# Patient Record
Sex: MALE | Race: WHITE | Employment: OTHER | URBAN - METROPOLITAN AREA
[De-identification: names, ages, dates, MRNs, and addresses within clinical notes are randomized per-mention and may not be internally consistent; named-entity substitution may affect disease eponyms.]

---

## 2018-06-27 ENCOUNTER — HOSPITAL ENCOUNTER (OUTPATIENT)
Age: 58
Discharge: HOME | End: 2018-06-29
Payer: COMMERCIAL

## 2018-06-27 ENCOUNTER — OFFICE VISIT (OUTPATIENT)
Dept: FAMILY MEDICINE CLINIC | Age: 58
End: 2018-06-27
Payer: COMMERCIAL

## 2018-06-27 VITALS
RESPIRATION RATE: 14 BRPM | TEMPERATURE: 98 F | WEIGHT: 179 LBS | BODY MASS INDEX: 29.82 KG/M2 | OXYGEN SATURATION: 93 % | HEART RATE: 70 BPM | SYSTOLIC BLOOD PRESSURE: 120 MMHG | DIASTOLIC BLOOD PRESSURE: 87 MMHG | HEIGHT: 65 IN

## 2018-06-27 DIAGNOSIS — E56.9 VITAMIN DEFICIENCY: ICD-10-CM

## 2018-06-27 DIAGNOSIS — E78.2 MIXED HYPERLIPIDEMIA: ICD-10-CM

## 2018-06-27 DIAGNOSIS — Z00.00 HEALTHCARE MAINTENANCE: Primary | ICD-10-CM

## 2018-06-27 DIAGNOSIS — R10.84 GENERALIZED ABDOMINAL PAIN: ICD-10-CM

## 2018-06-27 DIAGNOSIS — Z00.00 HEALTHCARE MAINTENANCE: ICD-10-CM

## 2018-06-27 DIAGNOSIS — F17.210 CIGARETTE NICOTINE DEPENDENCE WITHOUT COMPLICATION: ICD-10-CM

## 2018-06-27 DIAGNOSIS — Z12.5 PROSTATE CANCER SCREENING: ICD-10-CM

## 2018-06-27 DIAGNOSIS — Z12.11 COLON CANCER SCREENING: ICD-10-CM

## 2018-06-27 DIAGNOSIS — Z86.19 HISTORY OF HELICOBACTER PYLORI INFECTION: ICD-10-CM

## 2018-06-27 LAB
ALBUMIN SERPL-MCNC: 4.6 G/DL (ref 3.5–5.2)
ALP BLD-CCNC: 79 U/L (ref 40–129)
ALT SERPL-CCNC: 9 U/L (ref 0–40)
ANION GAP SERPL CALCULATED.3IONS-SCNC: 14 MMOL/L (ref 7–16)
AST SERPL-CCNC: 25 U/L (ref 0–39)
BILIRUB SERPL-MCNC: 0.3 MG/DL (ref 0–1.2)
BUN BLDV-MCNC: 16 MG/DL (ref 6–20)
CALCIUM SERPL-MCNC: 9.2 MG/DL (ref 8.6–10.2)
CHLORIDE BLD-SCNC: 100 MMOL/L (ref 98–107)
CHOLESTEROL, TOTAL: 253 MG/DL (ref 0–199)
CO2: 23 MMOL/L (ref 22–29)
CREAT SERPL-MCNC: 0.8 MG/DL (ref 0.7–1.2)
GFR AFRICAN AMERICAN: >60
GFR NON-AFRICAN AMERICAN: >60 ML/MIN/1.73
GLUCOSE BLD-MCNC: 84 MG/DL (ref 74–109)
HCT VFR BLD CALC: 47.8 % (ref 37–54)
HDLC SERPL-MCNC: 45 MG/DL
HEMOGLOBIN: 15.5 G/DL (ref 12.5–16.5)
LDL CHOLESTEROL CALCULATED: 167 MG/DL (ref 0–99)
MCH RBC QN AUTO: 29 PG (ref 26–35)
MCHC RBC AUTO-ENTMCNC: 32.4 % (ref 32–34.5)
MCV RBC AUTO: 89.5 FL (ref 80–99.9)
PDW BLD-RTO: 14.6 FL (ref 11.5–15)
PLATELET # BLD: 391 E9/L (ref 130–450)
PMV BLD AUTO: 11.1 FL (ref 7–12)
POTASSIUM SERPL-SCNC: 4.9 MMOL/L (ref 3.5–5)
RBC # BLD: 5.34 E12/L (ref 3.8–5.8)
SODIUM BLD-SCNC: 137 MMOL/L (ref 132–146)
TOTAL PROTEIN: 7.5 G/DL (ref 6.4–8.3)
TRIGL SERPL-MCNC: 205 MG/DL (ref 0–149)
TSH SERPL DL<=0.05 MIU/L-ACNC: 1.79 UIU/ML (ref 0.27–4.2)
VITAMIN D 25-HYDROXY: 32 NG/ML (ref 30–100)
VLDLC SERPL CALC-MCNC: 41 MG/DL
WBC # BLD: 9 E9/L (ref 4.5–11.5)

## 2018-06-27 PROCEDURE — 84443 ASSAY THYROID STIM HORMONE: CPT

## 2018-06-27 PROCEDURE — 82306 VITAMIN D 25 HYDROXY: CPT

## 2018-06-27 PROCEDURE — 85027 COMPLETE CBC AUTOMATED: CPT

## 2018-06-27 PROCEDURE — 80053 COMPREHEN METABOLIC PANEL: CPT

## 2018-06-27 PROCEDURE — 99214 OFFICE O/P EST MOD 30 MIN: CPT | Performed by: NURSE PRACTITIONER

## 2018-06-27 PROCEDURE — 86677 HELICOBACTER PYLORI ANTIBODY: CPT

## 2018-06-27 PROCEDURE — 80061 LIPID PANEL: CPT

## 2018-06-27 RX ORDER — PANTOPRAZOLE SODIUM 20 MG/1
20 TABLET, DELAYED RELEASE ORAL DAILY
Qty: 30 TABLET | Refills: 3 | Status: SHIPPED | OUTPATIENT
Start: 2018-06-27 | End: 2018-12-20 | Stop reason: SDUPTHER

## 2018-06-27 ASSESSMENT — PATIENT HEALTH QUESTIONNAIRE - PHQ9
1. LITTLE INTEREST OR PLEASURE IN DOING THINGS: 0
SUM OF ALL RESPONSES TO PHQ QUESTIONS 1-9: 0
2. FEELING DOWN, DEPRESSED OR HOPELESS: 0
SUM OF ALL RESPONSES TO PHQ9 QUESTIONS 1 & 2: 0

## 2018-06-27 NOTE — PATIENT INSTRUCTIONS
Patient Education        High Cholesterol: Care Instructions  Your Care Instructions    Cholesterol is a type of fat in your blood. It is needed for many body functions, such as making new cells. Cholesterol is made by your body. It also comes from food you eat. High cholesterol means that you have too much of the fat in your blood. This raises your risk of a heart attack and stroke. LDL and HDL are part of your total cholesterol. LDL is the \"bad\" cholesterol. High LDL can raise your risk for heart disease, heart attack, and stroke. HDL is the \"good\" cholesterol. It helps clear bad cholesterol from the body. High HDL is linked with a lower risk of heart disease, heart attack, and stroke. Your cholesterol levels help your doctor find out your risk for having a heart attack or stroke. You and your doctor can talk about whether you need to lower your risk and what treatment is best for you. A heart-healthy lifestyle along with medicines can help lower your cholesterol and your risk. The way you choose to lower your risk will depend on how high your risk is for heart attack and stroke. It will also depend on how you feel about taking medicines. Follow-up care is a key part of your treatment and safety. Be sure to make and go to all appointments, and call your doctor if you are having problems. It's also a good idea to know your test results and keep a list of the medicines you take. How can you care for yourself at home? · Eat a variety of foods every day. Good choices include fruits, vegetables, whole grains (like oatmeal), dried beans and peas, nuts and seeds, soy products (like tofu), and fat-free or low-fat dairy products. · Replace butter, margarine, and hydrogenated or partially hydrogenated oils with olive and canola oils. (Canola oil margarine without trans fat is fine.)  · Replace red meat with fish, poultry, and soy protein (like tofu).   · Limit processed and packaged foods like chips, crackers, and cookies. · Bake, broil, or steam foods. Don't lerma them. · Be physically active. Get at least 30 minutes of exercise on most days of the week. Walking is a good choice. You also may want to do other activities, such as running, swimming, cycling, or playing tennis or team sports. · Stay at a healthy weight or lose weight by making the changes in eating and physical activity listed above. Losing just a small amount of weight, even 5 to 10 pounds, can reduce your risk for having a heart attack or stroke. · Do not smoke. When should you call for help? Watch closely for changes in your health, and be sure to contact your doctor if:    · You need help making lifestyle changes.     · You have questions about your medicine. Where can you learn more? Go to https://SpaBookerpepiceweb.IKOTECH. org and sign in to your ParcelPoint account. Enter L958 in the coComment box to learn more about \"High Cholesterol: Care Instructions. \"     If you do not have an account, please click on the \"Sign Up Now\" link. Current as of: May 10, 2017  Content Version: 11.6  © 4122-0708 [address]. Care instructions adapted under license by [facility] ([facility]). If you have questions about a medical condition or this instruction, always ask your healthcare professional. [address] any warranty or liability for your use of this information. Patient Education        Stopping Smoking: Care Instructions  Your Care Instructions  Cigarette smokers crave the nicotine in cigarettes. Giving it up is much harder than simply changing a habit. Your body has to stop craving the nicotine. It is hard to quit, but you can do it. There are many tools that people use to quit smoking. You may find that combining tools works best for you. There are several steps to quitting. First you get ready to quit. Then you get support to help you. After that, you learn new skills and behaviors to become a nonsmoker.  For many people, a necessary step is getting and using medicine. Your doctor will help you set up the plan that best meets your needs. You may want to attend a smoking cessation program to help you quit smoking. When you choose a program, look for one that has proven success. Ask your doctor for ideas. You will greatly increase your chances of success if you take medicine as well as get counseling or join a cessation program.  Some of the changes you feel when you first quit tobacco are uncomfortable. Your body will miss the nicotine at first, and you may feel short-tempered and grumpy. You may have trouble sleeping or concentrating. Medicine can help you deal with these symptoms. You may struggle with changing your smoking habits and rituals. The last step is the tricky one: Be prepared for the smoking urge to continue for a time. This is a lot to deal with, but keep at it. You will feel better. Follow-up care is a key part of your treatment and safety. Be sure to make and go to all appointments, and call your doctor if you are having problems. It's also a good idea to know your test results and keep a list of the medicines you take. How can you care for yourself at home? · Ask your family, friends, and coworkers for support. You have a better chance of quitting if you have help and support. · Join a support group, such as Nicotine Anonymous, for people who are trying to quit smoking. · Consider signing up for a smoking cessation program, such as the American Lung Association's Freedom from Smoking program.  · Get text messaging support. Go to the website at www.smokefree. gov to sign up for the [facility] program.  · Set a quit date. Pick your date carefully so that it is not right in the middle of a big deadline or stressful time. Once you quit, do not even take a puff. Get rid of all ashtrays and lighters after your last cigarette. Clean your house and your clothes so that they do not smell of smoke.   · Learn

## 2018-06-28 LAB — HELICOBACTER PYLORI IGG: NORMAL

## 2018-07-01 RX ORDER — AMOXICILLIN 500 MG/1
CAPSULE ORAL
Qty: 56 CAPSULE | Refills: 0 | Status: SHIPPED | OUTPATIENT
Start: 2018-07-01 | End: 2018-08-01 | Stop reason: ALTCHOICE

## 2018-07-01 RX ORDER — CLARITHROMYCIN 500 MG/1
500 TABLET, COATED ORAL 2 TIMES DAILY
Qty: 28 TABLET | Refills: 0 | Status: SHIPPED | OUTPATIENT
Start: 2018-07-01 | End: 2018-09-25 | Stop reason: SDUPTHER

## 2018-07-02 ENCOUNTER — TELEPHONE (OUTPATIENT)
Dept: FAMILY MEDICINE CLINIC | Age: 58
End: 2018-07-02

## 2018-07-02 DIAGNOSIS — Z12.11 COLON CANCER SCREENING: ICD-10-CM

## 2018-07-02 DIAGNOSIS — R19.5 POSITIVE FIT (FECAL IMMUNOCHEMICAL TEST): Primary | ICD-10-CM

## 2018-07-02 LAB
CONTROL: NORMAL
HEMOCCULT STL QL: NORMAL

## 2018-07-02 PROCEDURE — 82274 ASSAY TEST FOR BLOOD FECAL: CPT | Performed by: NURSE PRACTITIONER

## 2018-07-02 NOTE — TELEPHONE ENCOUNTER
Pt returned fit test. Results positive. .Pt called. No answer. Left voicemail for pt to call office back.

## 2018-07-03 ENCOUNTER — TELEPHONE (OUTPATIENT)
Dept: SURGERY | Age: 58
End: 2018-07-03

## 2018-07-03 RX ORDER — EZETIMIBE 10 MG/1
10 TABLET ORAL DAILY
Qty: 30 TABLET | Refills: 5 | Status: SHIPPED | OUTPATIENT
Start: 2018-07-03 | End: 2019-07-06 | Stop reason: SDUPTHER

## 2018-07-05 ENCOUNTER — TELEPHONE (OUTPATIENT)
Dept: SURGERY | Age: 58
End: 2018-07-05

## 2018-07-08 ASSESSMENT — ENCOUNTER SYMPTOMS
PHOTOPHOBIA: 0
EYE ITCHING: 0
ABDOMINAL PAIN: 1
CHOKING: 0
EYE DISCHARGE: 0
APNEA: 0
RECTAL PAIN: 0
WHEEZING: 0
VOICE CHANGE: 0
SHORTNESS OF BREATH: 0
BLOOD IN STOOL: 0
STRIDOR: 0
ABDOMINAL DISTENTION: 0
COLOR CHANGE: 0
EYE PAIN: 0
CHEST TIGHTNESS: 0
SORE THROAT: 0
TROUBLE SWALLOWING: 0
DIARRHEA: 0
CONSTIPATION: 0
ANAL BLEEDING: 0
COUGH: 0
VOMITING: 0
EYE REDNESS: 0
NAUSEA: 0

## 2018-07-08 NOTE — PROGRESS NOTES
Sabino Pizano is a 62 y.o. male who presents today for   Chief Complaint   Patient presents with    Establish Care    GI Problem     H. pylori    Nicotine Dependence     would like prescription for Chantix     Hyperlipidemia         HPI      Pt presents today to re-establish care, last PCP was Dr. Kishor Durán in 2016. Pt has a h/o H Pylori Infection, pt concerned he may have infection again, he c/o generalized abd pain that has been chronic for the past several months, Denies N/V/D, dysphagia or bloody/tarry stools. Pt stated he was compliant in the past with H Pylori treatment. He is tolerating PO well and is afebrile. He also has a h/o Hyperlipidemia, he was on Mevacor in the past but stopped d/t GI upset and muscle cramping, pt is currently not on any OTC supplements. He does follow a low fat/cholesterol diet and does try to exercise 2-3 times a week. Pt is a smoker, he does not yet feel he is ready to  Quit, discussed options, pt will consider at a later time, he has smoked for > 20 yrs, 1 PPD. Pt is due for preventative labs including PSA and FIT, will add vitamin D level d/t h/o Vitamin D Deficiency. Pt has no other concerns      [address]:  Patient's past medical, surgical, social and/or family history reviewed, updated in chart, and are non-contributory (unless otherwise stated). Medications and allergies also reviewed and updated in chart. Review of Systems  Review of Systems   Constitutional: Negative for activity change, appetite change, chills, diaphoresis, fatigue, fever and unexpected weight change. HENT: Negative for congestion, ear discharge, ear pain, hearing loss, mouth sores, nosebleeds, sore throat, tinnitus, trouble swallowing and voice change. Eyes: Negative for photophobia, pain, discharge, redness, itching and visual disturbance.    Respiratory: Negative for apnea, cough, choking, chest tightness, shortness of breath, wheezing and stridor.         + Smoker Cardiovascular: Negative for chest pain, palpitations and leg swelling. Gastrointestinal: Positive for abdominal pain (generalized x 4 quads). Negative for abdominal distention, anal bleeding, blood in stool, constipation, diarrhea, nausea, rectal pain and vomiting.        H/o H Pylori Infection   Endocrine: Negative for cold intolerance, heat intolerance, polydipsia, polyphagia and polyuria. Genitourinary: Negative for decreased urine volume, difficulty urinating, enuresis, flank pain, frequency, hematuria and urgency. Musculoskeletal: Negative for arthralgias, joint swelling and myalgias. Skin: Negative for color change, pallor, rash and wound. Neurological: Negative for dizziness, tremors, seizures, syncope, facial asymmetry, speech difficulty, weakness, light-headedness, numbness and headaches. Hematological: Negative for adenopathy. Does not bruise/bleed easily. H/o Vitamin D Deficiency   Psychiatric/Behavioral: Negative for decreased concentration, dysphoric mood, self-injury, sleep disturbance and suicidal ideas. The patient is not nervous/anxious. Physical Exam:    VS:  /87 (Site: Right Arm, Position: Sitting, Cuff Size: Large Adult)   Pulse 70   Temp 98 °F (36.7 °C) (Oral)   Resp 14   Ht 5' 5\" (1.651 m)   Wt 179 lb (81.2 kg)   SpO2 93%   BMI 29.79 kg/m²   LAST WEIGHT:  Wt Readings from Last 3 Encounters:   06/27/18 179 lb (81.2 kg)   09/08/16 179 lb (81.2 kg)   07/30/16 181 lb (82.1 kg)     Physical Exam   Constitutional: He is oriented to person, place, and time. He appears well-developed and well-nourished. No distress. HENT:   Head: Normocephalic and atraumatic. Right Ear: External ear normal.   Left Ear: External ear normal.   Nose: Nose normal.   Mouth/Throat: Oropharynx is clear and moist. No oropharyngeal exudate. Eyes: Conjunctivae and EOM are normal. Pupils are equal, round, and reactive to light. Right eye exhibits no discharge.  Left eye exhibits no

## 2018-07-09 ENCOUNTER — TELEPHONE (OUTPATIENT)
Dept: SURGERY | Age: 58
End: 2018-07-09

## 2018-07-16 ENCOUNTER — OFFICE VISIT (OUTPATIENT)
Dept: FAMILY MEDICINE CLINIC | Age: 58
End: 2018-07-16
Payer: COMMERCIAL

## 2018-07-16 VITALS
SYSTOLIC BLOOD PRESSURE: 108 MMHG | WEIGHT: 179 LBS | DIASTOLIC BLOOD PRESSURE: 72 MMHG | RESPIRATION RATE: 20 BRPM | BODY MASS INDEX: 28.77 KG/M2 | HEART RATE: 81 BPM | HEIGHT: 66 IN | TEMPERATURE: 98 F | OXYGEN SATURATION: 95 %

## 2018-07-16 DIAGNOSIS — A04.8 HELICOBACTER PYLORI GASTROINTESTINAL TRACT INFECTION: Primary | ICD-10-CM

## 2018-07-16 DIAGNOSIS — F17.210 CIGARETTE NICOTINE DEPENDENCE WITHOUT COMPLICATION: ICD-10-CM

## 2018-07-16 PROCEDURE — 99214 OFFICE O/P EST MOD 30 MIN: CPT | Performed by: NURSE PRACTITIONER

## 2018-07-16 RX ORDER — VARENICLINE TARTRATE 25 MG
KIT ORAL
Qty: 1 EACH | Refills: 0 | Status: SHIPPED | OUTPATIENT
Start: 2018-07-16 | End: 2019-04-11 | Stop reason: SDUPTHER

## 2018-07-16 ASSESSMENT — ENCOUNTER SYMPTOMS
ABDOMINAL DISTENTION: 0
STRIDOR: 0
VOICE CHANGE: 0
BLOOD IN STOOL: 0
COUGH: 0
ANAL BLEEDING: 0
EYE REDNESS: 0
CHOKING: 0
DIARRHEA: 0
WHEEZING: 0
COLOR CHANGE: 0
NAUSEA: 0
ABDOMINAL PAIN: 0
RECTAL PAIN: 0
SHORTNESS OF BREATH: 0
TROUBLE SWALLOWING: 0
VOMITING: 0
CHEST TIGHTNESS: 0
CONSTIPATION: 0
EYE DISCHARGE: 0
APNEA: 0
EYE ITCHING: 0
EYE PAIN: 0
PHOTOPHOBIA: 0

## 2018-07-16 NOTE — PROGRESS NOTES
color change, pallor and rash. Psychiatric/Behavioral: Negative for dysphoric mood, self-injury, sleep disturbance and suicidal ideas. The patient is not nervous/anxious. Physical Exam:    VS:  /72   Pulse 81   Temp 98 °F (36.7 °C) (Oral)   Resp 20   Ht 5' 6\" (1.676 m)   Wt 179 lb (81.2 kg)   SpO2 95%   BMI 28.89 kg/m²   LAST WEIGHT:  Wt Readings from Last 3 Encounters:   07/16/18 179 lb (81.2 kg)   06/27/18 179 lb (81.2 kg)   09/08/16 179 lb (81.2 kg)     Physical Exam   Constitutional: He is oriented to person, place, and time. He appears well-developed and well-nourished. No distress. HENT:   Head: Normocephalic and atraumatic. Right Ear: External ear normal.   Left Ear: External ear normal.   Nose: Nose normal.   Mouth/Throat: Oropharynx is clear and moist.   Eyes: Conjunctivae and EOM are normal. Pupils are equal, round, and reactive to light. Right eye exhibits no discharge. Left eye exhibits no discharge. Neck: Normal range of motion. Neck supple. No JVD present. No thyromegaly present. Cardiovascular: Normal rate, regular rhythm, normal heart sounds and intact distal pulses. No murmur heard. No peripheral edema   Pulmonary/Chest: Effort normal and breath sounds normal. No respiratory distress. He has no wheezes. He has no rales. He exhibits no tenderness. Abdominal: Soft. Bowel sounds are normal. He exhibits no distension and no mass. There is no tenderness. There is no rebound and no guarding. Lymphadenopathy:     He has no cervical adenopathy. Neurological: He is alert and oriented to person, place, and time. Skin: Skin is warm and dry. No rash noted. He is not diaphoretic. No erythema. No pallor. Psychiatric: He has a normal mood and affect. His behavior is normal. Judgment and thought content normal.   Nursing note and vitals reviewed. Assessment / Plan:      Leeann Barrett was seen today for gi problem and nicotine dependence.     Diagnoses and all orders for this visit:    Helicobacter pylori gastrointestinal tract infection  Complete remainder of treatment plan as discussed    Cigarette nicotine dependence without complication  Advised on side effects of Chantix  -     varenicline (CHANTIX STARTING MONTH RICHY) 0.5 MG X 11 & 1 MG X 42 tablet; Take by mouth. Call or go to ED immediately if symptoms worsen or persist.    Return in about 2 months (around 9/16/2018) for f/u H Pylori-recheck labs. , or sooner if necessary. Educational materials and/or home exercises printed for patient's review and were included in patient instructions on his/her After Visit Summary and given to patient at the end of visit. Counseled regarding above diagnosis, including possible risks and complications,  especially if left uncontrolled. Counseled regarding the possible side effects, risks, benefits and alternatives to treatment; patient and/or guardian verbalizes understanding, agrees, feels comfortable with and wishes to proceed with above treatment plan. Advised patient to call with any new medication issues, and read all Rx info from pharmacy to assure aware of all possible risks and side effects of medication before taking. Reviewed age and gender appropriate health screening exams and vaccinations. Advised patient regarding importance of keeping up with recommended health maintenance and to schedule as soon as possible if overdue, as this is important in assessing for undiagnosed pathology, especially cancer, as well as protecting against potentially harmful/life threatening disease. Patient and/or guardian verbalizes understanding and agrees with above counseling, assessment and plan. All questions answered.     Elijah Byrd, APRN - CNP

## 2018-07-16 NOTE — PATIENT INSTRUCTIONS
worse.  · Wash your hands after going to the bathroom. · Avoid aspirin, ibuprofen, or other anti-inflammatory medicines, because they can irritate the stomach. If you need pain medicine, try acetaminophen (Tylenol). When should you call for help? Call 911 anytime you think you may need emergency care. For example, call if:    · You vomit blood or what looks like coffee grounds.     · Your stools are maroon or very bloody.    Call your doctor now or seek immediate medical care if:    · You have new or worse belly pain.     · You are vomiting.     · Your stools are black and look like tar, or they have streaks of blood.    Watch closely for changes in your health, and be sure to contact your doctor if:    · You do not get better as expected. Where can you learn more? Go to https://Catalist HomespeMOBITRACeb.Anchor Intelligence. org and sign in to your TradeBriefs account. Enter Y671 in the Ash Access Technology box to learn more about \"H. Pylori Bacterial Infection: Care Instructions. \"     If you do not have an account, please click on the \"Sign Up Now\" link. Current as of: May 12, 2017  Content Version: 11.6  © 3111-9000 [employer]. Care instructions adapted under license by [facility] ([facility]). If you have questions about a medical condition or this instruction, always ask your healthcare professional. [address] any warranty or liability for your use of this information.

## 2018-08-01 ENCOUNTER — OFFICE VISIT (OUTPATIENT)
Dept: SURGERY | Age: 58
End: 2018-08-01

## 2018-08-01 VITALS
OXYGEN SATURATION: 96 % | SYSTOLIC BLOOD PRESSURE: 122 MMHG | DIASTOLIC BLOOD PRESSURE: 68 MMHG | TEMPERATURE: 98 F | BODY MASS INDEX: 28.61 KG/M2 | RESPIRATION RATE: 16 BRPM | HEIGHT: 66 IN | WEIGHT: 178 LBS | HEART RATE: 83 BPM

## 2018-08-01 DIAGNOSIS — R19.5 POSITIVE FIT (FECAL IMMUNOCHEMICAL TEST): Primary | ICD-10-CM

## 2018-08-01 PROCEDURE — 99999 PR OFFICE/OUTPT VISIT,PROCEDURE ONLY: CPT | Performed by: SURGERY

## 2018-08-01 RX ORDER — POLYETHYLENE GLYCOL 3350, SODIUM CHLORIDE, POTASSIUM CHLORIDE, SODIUM BICARBONATE, AND SODIUM SULFATE 240; 5.84; 2.98; 6.72; 22.72 G/4L; G/4L; G/4L; G/4L; G/4L
4000 POWDER, FOR SOLUTION ORAL ONCE
Qty: 1 BOTTLE | Refills: 0 | Status: SHIPPED | OUTPATIENT
Start: 2018-08-01 | End: 2018-08-01

## 2018-08-01 ASSESSMENT — ENCOUNTER SYMPTOMS
NAUSEA: 0
ABDOMINAL PAIN: 0
CONSTIPATION: 0
CHEST TIGHTNESS: 0
BLOOD IN STOOL: 0
BACK PAIN: 1
CHOKING: 0
DIARRHEA: 0
COUGH: 0
ABDOMINAL DISTENTION: 0
EYES NEGATIVE: 1
WHEEZING: 0
VOMITING: 0
ANAL BLEEDING: 0
COLOR CHANGE: 0
SHORTNESS OF BREATH: 0

## 2018-08-01 NOTE — PATIENT INSTRUCTIONS
purchase at the pharmacy) to ensure that you colon is adequately cleaned out or you risk of complications could be increased. 2 Days or More Before Endoscopy:   Obtain two 8 to 10-ounce bottles of Magnesium Citrate and 1 bottle of Dulcolax tablets from the pharmacy.  Do not eat corn, tomatoes, peas or watermelon 3 to 5 days before procedure.  7:00pm Take 4 Dulcolax tablets and drink at least 8oz of clear liquids. 1 Day Before the Endoscopy:   No solid food  only clear liquids (soup, jello, or juice that you can see through with no solid food) for breakfast, lunch and supper. DO NOT drink or eat anything that is red as it will turn the inside of the colon red and look like blood. Nothing to eat or drink after midnight.  Have at least 8 oz or more of clear liquids for breakfast (7am to 8am) and lunch (11:30am to 12:30pm).  1:00pm Drink at least 8oz of clear liquids.  2:00pm Drink at least 8oz of clear liquids.  3:00pm Drink at least 8oz of clear liquids.  4:00pm Drink an 8 to 10oz bottle of Magnesium Citrate followed immediately by at least 8oz of clear liquids.  5:00pm Drink at least 8oz of clear liquids.  6:00pm Dinner  all clear liquids.  7:00pm Take 4 Dulcolax tablets and drink at least 8oz of clear liquids.  8:00pm Drink at least 8oz of clear liquids.  9:00pm Drink an 8 to 10oz bottle of Magnesium Citrate followed immediately by at least 8oz of clear liquids. Day of Endoscopy:   Nothing to eat or drink after midnight the night before the endoscopy. However, if you are taking any blood pressure medications or heart medications, you should take them with a sip of water.  If you are on INSULIN or OTHER DIABETIC MEDICATIONS, then check with your primary care physician as to how to adjust your medication while on clear liquid diet and when nothing by mouth.

## 2018-08-01 NOTE — PROGRESS NOTES
Subjective:      Patient ID: Cassi Roman is a 62 y.o. male. HPI  62 yr old male with no prior colonoscopy found to have positive FIT test.  Denies abdominal pain, change in bowel habits, blood in stool, unintentional weight loss, or family hx of colon cancer. Denies heartburn or reflux symptoms. Past Medical History:   Diagnosis Date    GERD (gastroesophageal reflux disease)     H pylori ulcer     recurrent yearly    Hyperlipidemia     Vitamin D deficiency        Past Surgical History:   Procedure Laterality Date    BICEPS TENDON REPAIR      HEMORRHOID SURGERY         Current Outpatient Prescriptions   Medication Sig Dispense Refill    varenicline (CHANTIX STARTING MONTH PAK) 0.5 MG X 11 & 1 MG X 42 tablet Take by mouth. 1 each 0    ezetimibe (ZETIA) 10 MG tablet Take 1 tablet by mouth daily 30 tablet 5    pantoprazole (PROTONIX) 20 MG tablet Take 1 tablet by mouth daily 30 tablet 3     No current facility-administered medications for this visit. No Known Allergies    Family History   Problem Relation Age of Onset    Arthritis Mother        Social History     Social History    Marital status:      Spouse name: N/A    Number of children: N/A    Years of education: N/A     Occupational History    Not on file. Social History Main Topics    Smoking status: Current Every Day Smoker     Packs/day: 0.50     Years: 40.00     Types: Cigarettes    Smokeless tobacco: Never Used    Alcohol use 2.4 oz/week     4 Cans of beer per week      Comment: occasional    Drug use: No    Sexual activity: Yes     Partners: Female     Other Topics Concern    Not on file     Social History Narrative    No narrative on file     Review of Systems   Constitutional: Negative for activity change, appetite change, chills, fever and unexpected weight change. HENT: Negative. Eyes: Negative. Respiratory: Negative for cough, choking, chest tightness, shortness of breath and wheezing. and no mass. There is no tenderness. There is no rebound and no guarding. Musculoskeletal: Normal range of motion. Neurological: He is alert and oriented to person, place, and time. Skin: Skin is warm and dry. He is not diaphoretic. Psychiatric: He has a normal mood and affect. His behavior is normal. Judgment and thought content normal.       Assessment:      Positive FIT test  At risk for colon cancer secondary to age      Plan:      Recommend colonoscopy. The patient was explained the risks/benefits/alternatives/expected outcomes of the procedure. The patient was explained the risks of the procedure, including, but not limited to, the risk of reaction to the anesthesia medicine and the risk of perforation requiring further surgery. The patient was informed that they may require biopsy or polypectomy. These procedures may increase the risk of complication. All questions were answered. The patient verbalized understanding and agreed to proceed.         Paul Haider MD, FACS  8/1/2018  3:36 PM

## 2018-08-06 ENCOUNTER — TELEPHONE (OUTPATIENT)
Dept: SURGERY | Age: 58
End: 2018-08-06

## 2018-08-09 ENCOUNTER — TELEPHONE (OUTPATIENT)
Dept: SURGERY | Age: 58
End: 2018-08-09

## 2018-08-09 NOTE — TELEPHONE ENCOUNTER
Scheduled pt for Colonoscopy with Dr. Shona Barreto on 9/14/18 at 10:30AM. Pt needs to arrive at [facility] at 9:30AM. Confirmed procedure date time and location with patient. Sent instruction sheet.     Electronically signed by Maria Dolores Berger on 8/9/18 at 10:54 AM

## 2018-09-04 ENCOUNTER — TELEPHONE (OUTPATIENT)
Dept: SURGERY | Age: 58
End: 2018-09-04

## 2018-09-04 NOTE — TELEPHONE ENCOUNTER
MA contacted Emeli for prior authorization. No prior authorization needed for colonoscopy with Dr. Elana Gillis at [address] [place] [address] in [facility] due to doctor and facility being in network. MA Spoke with Irving Valle, authorization Specialist. Reference number [id number]. MA scanned authorization form in media tab.     Electronically signed by Barney Matos on 9/4/18 at 12:30 PM

## 2018-09-10 ENCOUNTER — TELEPHONE (OUTPATIENT)
Dept: FAMILY MEDICINE CLINIC | Age: 58
End: 2018-09-10

## 2018-09-10 ENCOUNTER — TELEPHONE (OUTPATIENT)
Dept: SURGERY | Age: 58
End: 2018-09-10

## 2018-09-10 NOTE — TELEPHONE ENCOUNTER
MA received incoming [employer] message from patient requesting to cancel his colonoscopy with Dr. Arnaldo Solitario due to procedure not being covered by insurance. MA contacted patient to clarify cancellation and reason as patient's insurance relayed provider and facility are in network and colonoscopy is a coverable procedure. Awaiting return call from patient prior to cancelling.     Electronically signed by Jamar Coats on 9/10/18 at 8:32 AM

## 2018-09-10 NOTE — TELEPHONE ENCOUNTER
MA informed Sarah Price at [facility], [facility], referring provider office that patient is refusing to move forward with colonoscopy due to deductible. Sandra verbalized understanding.     Electronically signed by Barney Matos on 9/10/18 at 4:32 PM

## 2018-09-10 NOTE — TELEPHONE ENCOUNTER
Per patient via mychart, he wants to cancel his procedure with Dr. Monique Martinez. MA contacted patient by phone to see if patient would like to reschedule to later in the year once his deductible goes down. MA left voicemail with office contact information stressing patient return my call to get rescheduled for procedure.     Electronically signed by Terence Garza on 9/10/18 at 3:47 PM

## 2018-09-10 NOTE — TELEPHONE ENCOUNTER
Patient's wife Abby Tang called in stating that they do not wish to reschedule patients's procedure at this time and they will call our office when they decide to reschedule. NINO Bucio informed Abby Clyde she would let NINO Chen know.      Electronically signed by Stephanie Schmidt on 9/10/18 at 3:51 PM

## 2018-09-17 ENCOUNTER — OFFICE VISIT (OUTPATIENT)
Dept: FAMILY MEDICINE CLINIC | Age: 58
End: 2018-09-17
Payer: COMMERCIAL

## 2018-09-17 ENCOUNTER — HOSPITAL ENCOUNTER (OUTPATIENT)
Age: 58
Discharge: HOME | End: 2018-09-19
Payer: COMMERCIAL

## 2018-09-17 VITALS
TEMPERATURE: 98.2 F | BODY MASS INDEX: 29.25 KG/M2 | RESPIRATION RATE: 20 BRPM | OXYGEN SATURATION: 92 % | DIASTOLIC BLOOD PRESSURE: 86 MMHG | HEIGHT: 66 IN | HEART RATE: 73 BPM | WEIGHT: 182 LBS | SYSTOLIC BLOOD PRESSURE: 118 MMHG

## 2018-09-17 DIAGNOSIS — E78.2 MIXED HYPERLIPIDEMIA: Primary | ICD-10-CM

## 2018-09-17 DIAGNOSIS — Z86.19 HISTORY OF HELICOBACTER PYLORI INFECTION: ICD-10-CM

## 2018-09-17 PROCEDURE — 86677 HELICOBACTER PYLORI ANTIBODY: CPT

## 2018-09-17 PROCEDURE — 99214 OFFICE O/P EST MOD 30 MIN: CPT | Performed by: NURSE PRACTITIONER

## 2018-09-17 ASSESSMENT — ENCOUNTER SYMPTOMS
VOICE CHANGE: 0
DIARRHEA: 0
ABDOMINAL DISTENTION: 0
ANAL BLEEDING: 0
TROUBLE SWALLOWING: 0
PHOTOPHOBIA: 0
EYE REDNESS: 0
EYE PAIN: 0
BLOOD IN STOOL: 0
SHORTNESS OF BREATH: 0
COLOR CHANGE: 0
STRIDOR: 0
EYE DISCHARGE: 0
APNEA: 0
VOMITING: 0
ABDOMINAL PAIN: 0
CHOKING: 0
RECTAL PAIN: 0
COUGH: 0
WHEEZING: 0
NAUSEA: 0
CHEST TIGHTNESS: 0
CONSTIPATION: 0
EYE ITCHING: 0

## 2018-09-17 NOTE — PROGRESS NOTES
lifestyle modifications  Advised on OTC Red Yeast Rice and Niacin  Will repeat Lipid Panel in 12/18-see HPI    History of Helicobacter pylori infection  Further treatment plan will be based on IgG as discussed  -     H. Pylori Antibody, IgG; Future         Call or go to ED immediately if symptoms worsen or persist.    Return in about 3 months (around 12/17/2018) for f/u Hyperlipidemia-check preventative labs. , or sooner if necessary. Educational materials and/or home exercises printed for patient's review and were included in patient instructions on his/her After Visit Summary and given to patient at the end of visit. Counseled regarding above diagnosis, including possible risks and complications,  especially if left uncontrolled. Counseled regarding the possible side effects, risks, benefits and alternatives to treatment; patient and/or guardian verbalizes understanding, agrees, feels comfortable with and wishes to proceed with above treatment plan. Advised patient to call with any new medication issues, and read all Rx info from pharmacy to assure aware of all possible risks and side effects of medication before taking. Reviewed age and gender appropriate health screening exams and vaccinations. Advised patient regarding importance of keeping up with recommended health maintenance and to schedule as soon as possible if overdue, as this is important in assessing for undiagnosed pathology, especially cancer, as well as protecting against potentially harmful/life threatening disease. Patient and/or guardian verbalizes understanding and agrees with above counseling, assessment and plan. All questions answered.     WILLY Palacios - CNP

## 2018-09-17 NOTE — PATIENT INSTRUCTIONS
Patient Education        High Cholesterol: Care Instructions  Your Care Instructions    Cholesterol is a type of fat in your blood. It is needed for many body functions, such as making new cells. Cholesterol is made by your body. It also comes from food you eat. High cholesterol means that you have too much of the fat in your blood. This raises your risk of a heart attack and stroke. LDL and HDL are part of your total cholesterol. LDL is the \"bad\" cholesterol. High LDL can raise your risk for heart disease, heart attack, and stroke. HDL is the \"good\" cholesterol. It helps clear bad cholesterol from the body. High HDL is linked with a lower risk of heart disease, heart attack, and stroke. Your cholesterol levels help your doctor find out your risk for having a heart attack or stroke. You and your doctor can talk about whether you need to lower your risk and what treatment is best for you. A heart-healthy lifestyle along with medicines can help lower your cholesterol and your risk. The way you choose to lower your risk will depend on how high your risk is for heart attack and stroke. It will also depend on how you feel about taking medicines. Follow-up care is a key part of your treatment and safety. Be sure to make and go to all appointments, and call your doctor if you are having problems. It's also a good idea to know your test results and keep a list of the medicines you take. How can you care for yourself at home? · Eat a variety of foods every day. Good choices include fruits, vegetables, whole grains (like oatmeal), dried beans and peas, nuts and seeds, soy products (like tofu), and fat-free or low-fat dairy products. · Replace butter, margarine, and hydrogenated or partially hydrogenated oils with olive and canola oils. (Canola oil margarine without trans fat is fine.)  · Replace red meat with fish, poultry, and soy protein (like tofu).   · Limit processed and packaged foods like chips, crackers, and cookies. · Bake, broil, or steam foods. Don't lerma them. · Be physically active. Get at least 30 minutes of exercise on most days of the week. Walking is a good choice. You also may want to do other activities, such as running, swimming, cycling, or playing tennis or team sports. · Stay at a healthy weight or lose weight by making the changes in eating and physical activity listed above. Losing just a small amount of weight, even 5 to 10 pounds, can reduce your risk for having a heart attack or stroke. · Do not smoke. When should you call for help? Watch closely for changes in your health, and be sure to contact your doctor if:    · You need help making lifestyle changes.     · You have questions about your medicine. Where can you learn more? Go to https://Transmensionpepiceweb.[email or web address]. org and sign in to your [employer] account. Enter Y919 in the [employer] box to learn more about \"High Cholesterol: Care Instructions. \"     If you do not have an account, please click on the \"Sign Up Now\" link. Current as of: May 10, 2017  Content Version: 11.7  © 3940-3232 [employer]. Care instructions adapted under license by [facility] ([facility]). If you have questions about a medical condition or this instruction, always ask your healthcare professional. [address] any warranty or liability for your use of this information. Patient Education        H. Pylori Bacterial Infection: Care Instructions  Your Care Instructions    Your test shows the presence of Helicobacter pylori ( H. pylori), a kind of bacterium that lives in the lining of the stomach. Many people have H. pylori in their stomachs and do not develop problems. But sometimes H. pylori causes an upset stomach or a sore (ulcer) in the stomach lining. Most stomach ulcers are caused by H. pylori. Symptoms of an ulcer include gnawing or burning pain in the belly that can last minutes or hours.  Eating food or taking antacids

## 2018-09-18 LAB — HELICOBACTER PYLORI IGG: NORMAL

## 2018-09-25 ENCOUNTER — TELEPHONE (OUTPATIENT)
Dept: FAMILY MEDICINE CLINIC | Age: 58
End: 2018-09-25

## 2018-09-25 RX ORDER — AMOXICILLIN 500 MG/1
CAPSULE ORAL
Qty: 56 CAPSULE | Refills: 0 | Status: SHIPPED | OUTPATIENT
Start: 2018-09-25 | End: 2018-12-20

## 2018-09-25 RX ORDER — CLARITHROMYCIN 500 MG/1
500 TABLET, COATED ORAL 2 TIMES DAILY
Qty: 28 TABLET | Refills: 0 | Status: SHIPPED | OUTPATIENT
Start: 2018-09-25 | End: 2018-10-09

## 2018-12-20 ENCOUNTER — OFFICE VISIT (OUTPATIENT)
Dept: FAMILY MEDICINE CLINIC | Age: 58
End: 2018-12-20
Payer: COMMERCIAL

## 2018-12-20 VITALS
DIASTOLIC BLOOD PRESSURE: 78 MMHG | BODY MASS INDEX: 29.41 KG/M2 | WEIGHT: 183 LBS | RESPIRATION RATE: 16 BRPM | TEMPERATURE: 98 F | HEART RATE: 80 BPM | SYSTOLIC BLOOD PRESSURE: 112 MMHG | HEIGHT: 66 IN | OXYGEN SATURATION: 92 %

## 2018-12-20 DIAGNOSIS — Z86.19 HISTORY OF HELICOBACTER PYLORI INFECTION: ICD-10-CM

## 2018-12-20 DIAGNOSIS — F17.210 CIGARETTE NICOTINE DEPENDENCE WITHOUT COMPLICATION: ICD-10-CM

## 2018-12-20 DIAGNOSIS — E78.2 MIXED HYPERLIPIDEMIA: ICD-10-CM

## 2018-12-20 DIAGNOSIS — Z00.00 HEALTHCARE MAINTENANCE: Primary | ICD-10-CM

## 2018-12-20 DIAGNOSIS — Z12.5 SCREENING FOR PROSTATE CANCER: ICD-10-CM

## 2018-12-20 PROCEDURE — 99214 OFFICE O/P EST MOD 30 MIN: CPT | Performed by: NURSE PRACTITIONER

## 2018-12-20 RX ORDER — PANTOPRAZOLE SODIUM 20 MG/1
20 TABLET, DELAYED RELEASE ORAL DAILY
Qty: 30 TABLET | Refills: 5 | Status: SHIPPED | OUTPATIENT
Start: 2018-12-20 | End: 2019-05-30 | Stop reason: SDUPTHER

## 2018-12-20 ASSESSMENT — ENCOUNTER SYMPTOMS
EYE PAIN: 0
TROUBLE SWALLOWING: 0
ABDOMINAL PAIN: 0
WHEEZING: 0
ABDOMINAL DISTENTION: 0
STRIDOR: 0
CHEST TIGHTNESS: 0
EYE DISCHARGE: 0
DIARRHEA: 0
COUGH: 0
ANAL BLEEDING: 0
CHOKING: 0
PHOTOPHOBIA: 0
VOICE CHANGE: 0
NAUSEA: 0
SHORTNESS OF BREATH: 0
EYE REDNESS: 0
CONSTIPATION: 0
VOMITING: 0
EYE ITCHING: 0
BLOOD IN STOOL: 0
RECTAL PAIN: 0
APNEA: 0
COLOR CHANGE: 0

## 2018-12-20 NOTE — PROGRESS NOTES
disturbance. Respiratory: Negative for apnea, cough, choking, chest tightness, shortness of breath, wheezing and stridor.         + Smoker   Cardiovascular: Negative for chest pain, palpitations and leg swelling. Gastrointestinal: Negative for abdominal distention, abdominal pain, anal bleeding, blood in stool, constipation, diarrhea, nausea, rectal pain and vomiting.        H/o H Pylori-all s/s resolved   Endocrine: Negative for cold intolerance, heat intolerance, polydipsia, polyphagia and polyuria. Genitourinary: Negative for decreased urine volume, difficulty urinating, dysuria, enuresis, flank pain, frequency, hematuria and urgency. Skin: Negative for color change, pallor and rash. Neurological: Negative for dizziness, tremors, seizures, syncope, facial asymmetry, speech difficulty, weakness, light-headedness, numbness and headaches. Psychiatric/Behavioral: Negative for dysphoric mood. The patient is not nervous/anxious. Physical Exam:    VS:  /78 (Site: Right Upper Arm, Position: Sitting, Cuff Size: Medium Adult)   Pulse 80   Temp 98 °F (36.7 °C) (Oral)   Resp 16   Ht 5' 6\" (1.676 m)   Wt 183 lb (83 kg)   SpO2 92%   BMI 29.54 kg/m²   LAST WEIGHT:  Wt Readings from Last 3 Encounters:   12/20/18 183 lb (83 kg)   09/17/18 182 lb (82.6 kg)   08/01/18 178 lb (80.7 kg)     Physical Exam   Constitutional: He is oriented to person, place, and time. He appears well-developed and well-nourished. No distress. HENT:   Head: Normocephalic and atraumatic. Right Ear: External ear normal.   Left Ear: External ear normal.   Nose: Nose normal.   Mouth/Throat: Oropharynx is clear and moist.   Eyes: Pupils are equal, round, and reactive to light. Conjunctivae and EOM are normal. Right eye exhibits no discharge. Left eye exhibits no discharge. Neck: Normal range of motion. Neck supple. No JVD present. No thyromegaly present.    Cardiovascular: Normal rate, regular rhythm, normal heart sounds Future         Call or go to ED immediately if symptoms worsen or persist.    Return in about 6 months (around 6/20/2019) for f/u Hyperlipidemia. , or sooner if necessary. Educational materials and/or home exercises printed for patient's review and were included in patient instructions on his/her After Visit Summary and given to patient at the end of visit. Counseled regarding above diagnosis, including possible risks and complications,  especially if left uncontrolled. Counseled regarding the possible side effects, risks, benefits and alternatives to treatment; patient and/or guardian verbalizes understanding, agrees, feels comfortable with and wishes to proceed with above treatment plan. Advised patient to call with any new medication issues, and read all Rx info from pharmacy to assure aware of all possible risks and side effects of medication before taking. Reviewed age and gender appropriate health screening exams and vaccinations. Advised patient regarding importance of keeping up with recommended health maintenance and to schedule as soon as possible if overdue, as this is important in assessing for undiagnosed pathology, especially cancer, as well as protecting against potentially harmful/life threatening disease. Patient and/or guardian verbalizes understanding and agrees with above counseling, assessment and plan. All questions answered.     Blanca Parrish, APRN - CNP

## 2018-12-20 NOTE — PATIENT INSTRUCTIONS
For many people, a necessary step is getting and using medicine. Your doctor will help you set up the plan that best meets your needs. You may want to attend a smoking cessation program to help you quit smoking. When you choose a program, look for one that has proven success. Ask your doctor for ideas. You will greatly increase your chances of success if you take medicine as well as get counseling or join a cessation program.  Some of the changes you feel when you first quit tobacco are uncomfortable. Your body will miss the nicotine at first, and you may feel short-tempered and grumpy. You may have trouble sleeping or concentrating. Medicine can help you deal with these symptoms. You may struggle with changing your smoking habits and rituals. The last step is the tricky one: Be prepared for the smoking urge to continue for a time. This is a lot to deal with, but keep at it. You will feel better. Follow-up care is a key part of your treatment and safety. Be sure to make and go to all appointments, and call your doctor if you are having problems. It's also a good idea to know your test results and keep a list of the medicines you take. How can you care for yourself at home? · Ask your family, friends, and coworkers for support. You have a better chance of quitting if you have help and support. · Join a support group, such as Nicotine Anonymous, for people who are trying to quit smoking. · Consider signing up for a smoking cessation program, such as the American Lung Association's Freedom from Smoking program.  · Get text messaging support. Go to the website at www.smokefree. gov to sign up for the [facility] program.  · Set a quit date. Pick your date carefully so that it is not right in the middle of a big deadline or stressful time. Once you quit, do not even take a puff. Get rid of all ashtrays and lighters after your last cigarette.  Clean your house and your clothes so that they do not smell of

## 2018-12-21 ENCOUNTER — HOSPITAL ENCOUNTER (OUTPATIENT)
Age: 58
Discharge: HOME | End: 2018-12-21
Payer: COMMERCIAL

## 2018-12-21 DIAGNOSIS — E78.2 MIXED HYPERLIPIDEMIA: ICD-10-CM

## 2018-12-21 DIAGNOSIS — Z12.5 SCREENING FOR PROSTATE CANCER: ICD-10-CM

## 2018-12-21 DIAGNOSIS — Z86.19 HISTORY OF HELICOBACTER PYLORI INFECTION: ICD-10-CM

## 2018-12-21 DIAGNOSIS — Z00.00 HEALTHCARE MAINTENANCE: ICD-10-CM

## 2018-12-21 LAB
ALBUMIN SERPL-MCNC: 4.5 G/DL (ref 3.5–5.2)
ALP BLD-CCNC: 79 U/L (ref 40–129)
ALT SERPL-CCNC: 13 U/L (ref 0–40)
ANION GAP SERPL CALCULATED.3IONS-SCNC: 11 MMOL/L (ref 7–16)
AST SERPL-CCNC: 16 U/L (ref 0–39)
BASOPHILS ABSOLUTE: 0.08 E9/L (ref 0–0.2)
BASOPHILS RELATIVE PERCENT: 0.9 % (ref 0–2)
BILIRUB SERPL-MCNC: 0.4 MG/DL (ref 0–1.2)
BUN BLDV-MCNC: 16 MG/DL (ref 6–20)
CALCIUM SERPL-MCNC: 9.4 MG/DL (ref 8.6–10.2)
CHLORIDE BLD-SCNC: 102 MMOL/L (ref 98–107)
CHOLESTEROL, TOTAL: 243 MG/DL (ref 0–199)
CO2: 28 MMOL/L (ref 22–29)
CREAT SERPL-MCNC: 0.8 MG/DL (ref 0.7–1.2)
EOSINOPHILS ABSOLUTE: 0.08 E9/L (ref 0.05–0.5)
EOSINOPHILS RELATIVE PERCENT: 0.9 % (ref 0–6)
GFR AFRICAN AMERICAN: >60
GFR NON-AFRICAN AMERICAN: >60 ML/MIN/1.73
GLUCOSE BLD-MCNC: 97 MG/DL (ref 74–99)
HCT VFR BLD CALC: 46.6 % (ref 37–54)
HDLC SERPL-MCNC: 42 MG/DL
HEMOGLOBIN: 15.1 G/DL (ref 12.5–16.5)
IMMATURE GRANULOCYTES #: 0.03 E9/L
IMMATURE GRANULOCYTES %: 0.3 % (ref 0–5)
LDL CHOLESTEROL CALCULATED: 176 MG/DL (ref 0–99)
LYMPHOCYTES ABSOLUTE: 2.49 E9/L (ref 1.5–4)
LYMPHOCYTES RELATIVE PERCENT: 27.3 % (ref 20–42)
MCH RBC QN AUTO: 28.9 PG (ref 26–35)
MCHC RBC AUTO-ENTMCNC: 32.4 % (ref 32–34.5)
MCV RBC AUTO: 89.1 FL (ref 80–99.9)
MONOCYTES ABSOLUTE: 0.74 E9/L (ref 0.1–0.95)
MONOCYTES RELATIVE PERCENT: 8.1 % (ref 2–12)
NEUTROPHILS ABSOLUTE: 5.71 E9/L (ref 1.8–7.3)
NEUTROPHILS RELATIVE PERCENT: 62.5 % (ref 43–80)
PDW BLD-RTO: 13.3 FL (ref 11.5–15)
PLATELET # BLD: 355 E9/L (ref 130–450)
PMV BLD AUTO: 10.5 FL (ref 7–12)
POTASSIUM SERPL-SCNC: 4.2 MMOL/L (ref 3.5–5)
PROSTATE SPECIFIC ANTIGEN: 5.82 NG/ML (ref 0–4)
RBC # BLD: 5.23 E12/L (ref 3.8–5.8)
SODIUM BLD-SCNC: 141 MMOL/L (ref 132–146)
TOTAL PROTEIN: 7.7 G/DL (ref 6.4–8.3)
TRIGL SERPL-MCNC: 124 MG/DL (ref 0–149)
TSH SERPL DL<=0.05 MIU/L-ACNC: 1.49 UIU/ML (ref 0.27–4.2)
VLDLC SERPL CALC-MCNC: 25 MG/DL
WBC # BLD: 9.1 E9/L (ref 4.5–11.5)

## 2018-12-21 PROCEDURE — 80053 COMPREHEN METABOLIC PANEL: CPT

## 2018-12-21 PROCEDURE — G0103 PSA SCREENING: HCPCS

## 2018-12-21 PROCEDURE — 86677 HELICOBACTER PYLORI ANTIBODY: CPT

## 2018-12-21 PROCEDURE — 80061 LIPID PANEL: CPT

## 2018-12-21 PROCEDURE — 84443 ASSAY THYROID STIM HORMONE: CPT

## 2018-12-21 PROCEDURE — 36415 COLL VENOUS BLD VENIPUNCTURE: CPT

## 2018-12-21 PROCEDURE — 85025 COMPLETE CBC W/AUTO DIFF WBC: CPT

## 2018-12-25 DIAGNOSIS — R97.20 ELEVATED PSA: Primary | ICD-10-CM

## 2018-12-27 LAB — HELICOBACTER PYLORI IGG: NORMAL

## 2019-01-09 DIAGNOSIS — R19.5 POSITIVE FIT (FECAL IMMUNOCHEMICAL TEST): Primary | ICD-10-CM

## 2019-01-09 DIAGNOSIS — Z86.19 HISTORY OF HELICOBACTER PYLORI INFECTION: ICD-10-CM

## 2019-04-11 DIAGNOSIS — F17.210 CIGARETTE NICOTINE DEPENDENCE WITHOUT COMPLICATION: ICD-10-CM

## 2019-04-12 RX ORDER — VARENICLINE TARTRATE 25 MG
KIT ORAL
Qty: 1 EACH | Refills: 0 | Status: SHIPPED | OUTPATIENT
Start: 2019-04-12 | End: 2019-08-30 | Stop reason: SDUPTHER

## 2019-05-17 ENCOUNTER — HOSPITAL ENCOUNTER (OUTPATIENT)
Age: 59
Discharge: HOME | End: 2019-05-19

## 2019-05-17 PROCEDURE — 87081 CULTURE SCREEN ONLY: CPT

## 2019-05-17 PROCEDURE — 88305 TISSUE EXAM BY PATHOLOGIST: CPT

## 2019-05-18 LAB — CLOTEST: NORMAL

## 2019-05-30 DIAGNOSIS — Z86.19 HISTORY OF HELICOBACTER PYLORI INFECTION: ICD-10-CM

## 2019-05-30 RX ORDER — PANTOPRAZOLE SODIUM 20 MG/1
20 TABLET, DELAYED RELEASE ORAL 2 TIMES DAILY
Qty: 60 TABLET | Refills: 5 | Status: SHIPPED | OUTPATIENT
Start: 2019-05-30 | End: 2020-01-28 | Stop reason: ALTCHOICE

## 2019-06-24 ENCOUNTER — HOSPITAL ENCOUNTER (OUTPATIENT)
Age: 59
Discharge: HOME | End: 2019-06-26
Payer: COMMERCIAL

## 2019-06-24 ENCOUNTER — OFFICE VISIT (OUTPATIENT)
Dept: FAMILY MEDICINE CLINIC | Age: 59
End: 2019-06-24
Payer: COMMERCIAL

## 2019-06-24 VITALS
TEMPERATURE: 98.2 F | HEIGHT: 66 IN | OXYGEN SATURATION: 91 % | SYSTOLIC BLOOD PRESSURE: 138 MMHG | DIASTOLIC BLOOD PRESSURE: 78 MMHG | WEIGHT: 175 LBS | BODY MASS INDEX: 28.12 KG/M2 | HEART RATE: 71 BPM | RESPIRATION RATE: 16 BRPM

## 2019-06-24 DIAGNOSIS — E78.2 MIXED HYPERLIPIDEMIA: ICD-10-CM

## 2019-06-24 DIAGNOSIS — F17.210 CIGARETTE NICOTINE DEPENDENCE WITHOUT COMPLICATION: ICD-10-CM

## 2019-06-24 DIAGNOSIS — Z00.00 HEALTHCARE MAINTENANCE: Primary | ICD-10-CM

## 2019-06-24 DIAGNOSIS — Z00.00 HEALTHCARE MAINTENANCE: ICD-10-CM

## 2019-06-24 LAB
ALBUMIN SERPL-MCNC: 4.5 G/DL (ref 3.5–5.2)
ALP BLD-CCNC: 69 U/L (ref 40–129)
ALT SERPL-CCNC: 8 U/L (ref 0–40)
ANION GAP SERPL CALCULATED.3IONS-SCNC: 14 MMOL/L (ref 7–16)
AST SERPL-CCNC: 22 U/L (ref 0–39)
BASOPHILS ABSOLUTE: 0.07 E9/L (ref 0–0.2)
BASOPHILS RELATIVE PERCENT: 0.8 % (ref 0–2)
BILIRUB SERPL-MCNC: 0.4 MG/DL (ref 0–1.2)
BUN BLDV-MCNC: 11 MG/DL (ref 6–20)
CALCIUM SERPL-MCNC: 9.5 MG/DL (ref 8.6–10.2)
CHLORIDE BLD-SCNC: 101 MMOL/L (ref 98–107)
CHOLESTEROL, TOTAL: 202 MG/DL (ref 0–199)
CO2: 25 MMOL/L (ref 22–29)
CREAT SERPL-MCNC: 0.8 MG/DL (ref 0.7–1.2)
EOSINOPHILS ABSOLUTE: 0.09 E9/L (ref 0.05–0.5)
EOSINOPHILS RELATIVE PERCENT: 1 % (ref 0–6)
GFR AFRICAN AMERICAN: >60
GFR NON-AFRICAN AMERICAN: >60 ML/MIN/1.73
GLUCOSE BLD-MCNC: 91 MG/DL (ref 74–99)
HCT VFR BLD CALC: 47.6 % (ref 37–54)
HDLC SERPL-MCNC: 45 MG/DL
HEMOGLOBIN: 15 G/DL (ref 12.5–16.5)
IMMATURE GRANULOCYTES #: 0.03 E9/L
IMMATURE GRANULOCYTES %: 0.3 % (ref 0–5)
LDL CHOLESTEROL CALCULATED: 140 MG/DL (ref 0–99)
LYMPHOCYTES ABSOLUTE: 2.24 E9/L (ref 1.5–4)
LYMPHOCYTES RELATIVE PERCENT: 26.1 % (ref 20–42)
MCH RBC QN AUTO: 29 PG (ref 26–35)
MCHC RBC AUTO-ENTMCNC: 31.5 % (ref 32–34.5)
MCV RBC AUTO: 91.9 FL (ref 80–99.9)
MONOCYTES ABSOLUTE: 0.68 E9/L (ref 0.1–0.95)
MONOCYTES RELATIVE PERCENT: 7.9 % (ref 2–12)
NEUTROPHILS ABSOLUTE: 5.47 E9/L (ref 1.8–7.3)
NEUTROPHILS RELATIVE PERCENT: 63.9 % (ref 43–80)
PDW BLD-RTO: 14.4 FL (ref 11.5–15)
PLATELET # BLD: 366 E9/L (ref 130–450)
PMV BLD AUTO: 11 FL (ref 7–12)
POTASSIUM SERPL-SCNC: 4.7 MMOL/L (ref 3.5–5)
RBC # BLD: 5.18 E12/L (ref 3.8–5.8)
SODIUM BLD-SCNC: 140 MMOL/L (ref 132–146)
TOTAL PROTEIN: 7.6 G/DL (ref 6.4–8.3)
TRIGL SERPL-MCNC: 86 MG/DL (ref 0–149)
TSH SERPL DL<=0.05 MIU/L-ACNC: 1.26 UIU/ML (ref 0.27–4.2)
VLDLC SERPL CALC-MCNC: 17 MG/DL
WBC # BLD: 8.6 E9/L (ref 4.5–11.5)

## 2019-06-24 PROCEDURE — 99214 OFFICE O/P EST MOD 30 MIN: CPT | Performed by: NURSE PRACTITIONER

## 2019-06-24 PROCEDURE — 84443 ASSAY THYROID STIM HORMONE: CPT

## 2019-06-24 PROCEDURE — 80053 COMPREHEN METABOLIC PANEL: CPT

## 2019-06-24 PROCEDURE — 85025 COMPLETE CBC W/AUTO DIFF WBC: CPT

## 2019-06-24 PROCEDURE — 80061 LIPID PANEL: CPT

## 2019-06-24 ASSESSMENT — PATIENT HEALTH QUESTIONNAIRE - PHQ9
2. FEELING DOWN, DEPRESSED OR HOPELESS: 1
SUM OF ALL RESPONSES TO PHQ9 QUESTIONS 1 & 2: 1
SUM OF ALL RESPONSES TO PHQ QUESTIONS 1-9: 1
1. LITTLE INTEREST OR PLEASURE IN DOING THINGS: 0
SUM OF ALL RESPONSES TO PHQ QUESTIONS 1-9: 1

## 2019-06-24 ASSESSMENT — ENCOUNTER SYMPTOMS
CHEST TIGHTNESS: 0
SHORTNESS OF BREATH: 0
ANAL BLEEDING: 0
PHOTOPHOBIA: 0
RECTAL PAIN: 0
APNEA: 0
WHEEZING: 0
TROUBLE SWALLOWING: 0
DIARRHEA: 0
VOICE CHANGE: 0
VOMITING: 0
NAUSEA: 0
EYE PAIN: 0
EYE DISCHARGE: 0
BLOOD IN STOOL: 0
COUGH: 0
CHOKING: 0
EYE ITCHING: 0
COLOR CHANGE: 0
EYE REDNESS: 0
CONSTIPATION: 0
ABDOMINAL PAIN: 0
ABDOMINAL DISTENTION: 0
STRIDOR: 0

## 2019-06-24 NOTE — PROGRESS NOTES
Ivonne Rodriguez is a 61 y.o. male who presents today for   Chief Complaint   Patient presents with    Hyperlipidemia    Nicotine Dependence         HPI      Hyperlipidemia:  No new myalgias or GI upset on eztemibe (Zetia). Medication compliance: compliant all of the time. Patient is  following a low fat, low cholesterol diet. He is  exercising regularly. Lab Results   Component Value Date    CHOL 243 (H) 12/21/2018    TRIG 124 12/21/2018    HDL 42 12/21/2018    LDLCALC 176 (H) 12/21/2018     Lab Results   Component Value Date    ALT 13 12/21/2018    AST 16 12/21/2018        Nicotine Abuse  Pt continues to smoke intermittently, pt has been prescribed Chantix but has not been taking medication as ordered. Pt does not wish to try a different smoking option at this time    Pt is due for Preventative Labs today      [address]:  Patient's past medical, surgical, social and/or family history reviewed, updated in chart, and are non-contributory (unless otherwise stated). Medications and allergies also reviewed and updated in chart. Review of Systems  Review of Systems   Constitutional: Negative for activity change, appetite change, chills, diaphoresis, fatigue, fever and unexpected weight change. HENT: Negative for hearing loss, mouth sores, nosebleeds, trouble swallowing and voice change. Eyes: Negative for photophobia, pain, discharge, redness, itching and visual disturbance. Respiratory: Negative for apnea, cough, choking, chest tightness, shortness of breath, wheezing and stridor.         + Smoker   Cardiovascular: Negative for chest pain, palpitations and leg swelling. Gastrointestinal: Negative for abdominal distention, abdominal pain, anal bleeding, blood in stool, constipation, diarrhea, nausea, rectal pain and vomiting.        H/o H Pylori-all s/s resolved   Endocrine: Negative for cold intolerance, heat intolerance, polydipsia, polyphagia and polyuria.    Genitourinary: Negative for decreased urine volume, difficulty urinating, dysuria, enuresis, flank pain, frequency, hematuria and urgency. Skin: Negative for color change, pallor and rash. Neurological: Negative for dizziness, tremors, seizures, syncope, facial asymmetry, speech difficulty, weakness, light-headedness, numbness and headaches. Psychiatric/Behavioral: Negative for dysphoric mood. The patient is not nervous/anxious. Physical Exam:    VS:  /78 (Site: Right Upper Arm, Position: Sitting, Cuff Size: Large Adult)   Pulse 71   Temp 98.2 °F (36.8 °C) (Temporal)   Resp 16   Ht 5' 6\" (1.676 m)   Wt 175 lb (79.4 kg)   SpO2 91%   BMI 28.25 kg/m²   LAST WEIGHT:  Wt Readings from Last 3 Encounters:   06/24/19 175 lb (79.4 kg)   12/20/18 183 lb (83 kg)   09/17/18 182 lb (82.6 kg)     Physical Exam   Constitutional: He is oriented to person, place, and time. He appears well-developed and well-nourished. No distress. HENT:   Head: Normocephalic and atraumatic. Right Ear: External ear normal.   Left Ear: External ear normal.   Nose: Nose normal.   Mouth/Throat: Oropharynx is clear and moist.   Eyes: Pupils are equal, round, and reactive to light. Conjunctivae and EOM are normal. Right eye exhibits no discharge. Left eye exhibits no discharge. Neck: Normal range of motion. Neck supple. No JVD present. No thyromegaly present. Cardiovascular: Normal rate, regular rhythm, normal heart sounds and intact distal pulses. No murmur heard. No peripheral edema   Pulmonary/Chest: Effort normal and breath sounds normal. No respiratory distress. He has no wheezes. He has no rales. He exhibits no tenderness. Abdominal: Soft. Bowel sounds are normal. He exhibits no distension and no mass. There is no tenderness. There is no rebound and no guarding. Lymphadenopathy:     He has no cervical adenopathy. Neurological: He is alert and oriented to person, place, and time. He displays normal reflexes.  No cranial nerve deficit or with recommended health maintenance and to schedule as soon as possible if overdue, as this is important in assessing for undiagnosed pathology, especially cancer, as well as protecting against potentially harmful/life threatening disease. Patient and/or guardian verbalizes understanding and agrees with above counseling, assessment and plan. All questions answered.     Ascencion Vera, APRN - CNP

## 2019-08-30 DIAGNOSIS — F17.210 CIGARETTE NICOTINE DEPENDENCE WITHOUT COMPLICATION: ICD-10-CM

## 2019-08-30 RX ORDER — VARENICLINE TARTRATE 25 MG
KIT ORAL
Qty: 1 BOX | Refills: 0 | Status: SHIPPED | OUTPATIENT
Start: 2019-08-30 | End: 2019-11-09 | Stop reason: SDUPTHER

## 2019-09-26 RX ORDER — EZETIMIBE 10 MG/1
10 TABLET ORAL DAILY
Qty: 30 TABLET | Refills: 5 | Status: SHIPPED | OUTPATIENT
Start: 2019-09-26 | End: 2019-10-26 | Stop reason: SDUPTHER

## 2019-10-27 RX ORDER — EZETIMIBE 10 MG/1
10 TABLET ORAL DAILY
Qty: 30 TABLET | Refills: 5 | Status: SHIPPED
Start: 2019-10-27 | End: 2020-05-15

## 2019-10-28 RX ORDER — BUPROPION HYDROCHLORIDE 150 MG/1
150 TABLET, EXTENDED RELEASE ORAL 2 TIMES DAILY
Qty: 60 TABLET | Refills: 3 | Status: SHIPPED
Start: 2019-10-28 | End: 2021-03-04 | Stop reason: ALTCHOICE

## 2019-11-09 DIAGNOSIS — F17.210 CIGARETTE NICOTINE DEPENDENCE WITHOUT COMPLICATION: ICD-10-CM

## 2019-11-11 RX ORDER — VARENICLINE TARTRATE 25 MG
KIT ORAL
Qty: 1 BOX | Refills: 0 | Status: SHIPPED | OUTPATIENT
Start: 2019-11-11 | End: 2020-01-28 | Stop reason: ALTCHOICE

## 2019-11-22 ENCOUNTER — HOSPITAL ENCOUNTER (OUTPATIENT)
Age: 59
Discharge: HOME | End: 2019-11-24

## 2019-11-22 LAB — CLOTEST: NORMAL

## 2019-11-22 PROCEDURE — 87081 CULTURE SCREEN ONLY: CPT

## 2019-11-22 PROCEDURE — 88342 IMHCHEM/IMCYTCHM 1ST ANTB: CPT

## 2019-11-22 PROCEDURE — 88305 TISSUE EXAM BY PATHOLOGIST: CPT

## 2020-01-28 ENCOUNTER — OFFICE VISIT (OUTPATIENT)
Dept: FAMILY MEDICINE CLINIC | Age: 60
End: 2020-01-28
Payer: COMMERCIAL

## 2020-01-28 ENCOUNTER — HOSPITAL ENCOUNTER (OUTPATIENT)
Age: 60
Discharge: HOME | End: 2020-01-30
Payer: COMMERCIAL

## 2020-01-28 VITALS
TEMPERATURE: 98.3 F | HEIGHT: 66 IN | SYSTOLIC BLOOD PRESSURE: 106 MMHG | RESPIRATION RATE: 16 BRPM | OXYGEN SATURATION: 98 % | BODY MASS INDEX: 28.12 KG/M2 | WEIGHT: 175 LBS | HEART RATE: 78 BPM | DIASTOLIC BLOOD PRESSURE: 78 MMHG

## 2020-01-28 LAB
ALBUMIN SERPL-MCNC: 4.9 G/DL (ref 3.5–5.2)
ALP BLD-CCNC: 67 U/L (ref 40–129)
ALT SERPL-CCNC: 12 U/L (ref 0–40)
ANION GAP SERPL CALCULATED.3IONS-SCNC: 16 MMOL/L (ref 7–16)
AST SERPL-CCNC: 24 U/L (ref 0–39)
BASOPHILS ABSOLUTE: 0.08 E9/L (ref 0–0.2)
BASOPHILS RELATIVE PERCENT: 0.8 % (ref 0–2)
BILIRUB SERPL-MCNC: 0.3 MG/DL (ref 0–1.2)
BUN BLDV-MCNC: 16 MG/DL (ref 6–20)
CALCIUM SERPL-MCNC: 9.5 MG/DL (ref 8.6–10.2)
CHLORIDE BLD-SCNC: 101 MMOL/L (ref 98–107)
CHOLESTEROL, TOTAL: 216 MG/DL (ref 0–199)
CO2: 26 MMOL/L (ref 22–29)
CREAT SERPL-MCNC: 0.8 MG/DL (ref 0.7–1.2)
EOSINOPHILS ABSOLUTE: 0.09 E9/L (ref 0.05–0.5)
EOSINOPHILS RELATIVE PERCENT: 0.9 % (ref 0–6)
GFR AFRICAN AMERICAN: >60
GFR NON-AFRICAN AMERICAN: >60 ML/MIN/1.73
GLUCOSE BLD-MCNC: 89 MG/DL (ref 74–99)
HCT VFR BLD CALC: 48.7 % (ref 37–54)
HDLC SERPL-MCNC: 45 MG/DL
HEMOGLOBIN: 14.8 G/DL (ref 12.5–16.5)
IMMATURE GRANULOCYTES #: 0.04 E9/L
IMMATURE GRANULOCYTES %: 0.4 % (ref 0–5)
LDL CHOLESTEROL CALCULATED: 146 MG/DL (ref 0–99)
LYMPHOCYTES ABSOLUTE: 2.7 E9/L (ref 1.5–4)
LYMPHOCYTES RELATIVE PERCENT: 26.4 % (ref 20–42)
MCH RBC QN AUTO: 27.8 PG (ref 26–35)
MCHC RBC AUTO-ENTMCNC: 30.4 % (ref 32–34.5)
MCV RBC AUTO: 91.4 FL (ref 80–99.9)
MONOCYTES ABSOLUTE: 0.71 E9/L (ref 0.1–0.95)
MONOCYTES RELATIVE PERCENT: 6.9 % (ref 2–12)
NEUTROPHILS ABSOLUTE: 6.61 E9/L (ref 1.8–7.3)
NEUTROPHILS RELATIVE PERCENT: 64.6 % (ref 43–80)
PDW BLD-RTO: 14.2 FL (ref 11.5–15)
PLATELET # BLD: 382 E9/L (ref 130–450)
PMV BLD AUTO: 11.2 FL (ref 7–12)
POTASSIUM SERPL-SCNC: 4.7 MMOL/L (ref 3.5–5)
PROSTATE SPECIFIC ANTIGEN: 5.14 NG/ML (ref 0–4)
RBC # BLD: 5.33 E12/L (ref 3.8–5.8)
SODIUM BLD-SCNC: 143 MMOL/L (ref 132–146)
TOTAL PROTEIN: 7.8 G/DL (ref 6.4–8.3)
TRIGL SERPL-MCNC: 127 MG/DL (ref 0–149)
TSH SERPL DL<=0.05 MIU/L-ACNC: 1.76 UIU/ML (ref 0.27–4.2)
VLDLC SERPL CALC-MCNC: 25 MG/DL
WBC # BLD: 10.2 E9/L (ref 4.5–11.5)

## 2020-01-28 PROCEDURE — 99214 OFFICE O/P EST MOD 30 MIN: CPT | Performed by: NURSE PRACTITIONER

## 2020-01-28 PROCEDURE — 80061 LIPID PANEL: CPT

## 2020-01-28 PROCEDURE — G0103 PSA SCREENING: HCPCS

## 2020-01-28 PROCEDURE — 85025 COMPLETE CBC W/AUTO DIFF WBC: CPT

## 2020-01-28 PROCEDURE — 80053 COMPREHEN METABOLIC PANEL: CPT

## 2020-01-28 PROCEDURE — 84443 ASSAY THYROID STIM HORMONE: CPT

## 2020-01-28 RX ORDER — VARENICLINE TARTRATE 0.5 MG/1
0.5 TABLET, FILM COATED ORAL 2 TIMES DAILY
Qty: 60 TABLET | Refills: 3 | Status: SHIPPED
Start: 2020-01-28 | End: 2020-08-27 | Stop reason: SDUPTHER

## 2020-01-28 RX ORDER — PANTOPRAZOLE SODIUM 20 MG/1
40 TABLET, DELAYED RELEASE ORAL 2 TIMES DAILY
Qty: 60 TABLET | Refills: 5 | Status: CANCELLED | OUTPATIENT
Start: 2020-01-28

## 2020-01-28 RX ORDER — PANTOPRAZOLE SODIUM 40 MG/1
40 TABLET, DELAYED RELEASE ORAL DAILY
Qty: 90 TABLET | Refills: 0 | COMMUNITY
Start: 2020-01-28 | End: 2021-03-04 | Stop reason: DRUGHIGH

## 2020-01-28 ASSESSMENT — ENCOUNTER SYMPTOMS
COUGH: 0
APNEA: 0
VOICE CHANGE: 0
PHOTOPHOBIA: 0
STRIDOR: 0
EYE REDNESS: 0
TROUBLE SWALLOWING: 0
COLOR CHANGE: 0
EYE PAIN: 0
BLOOD IN STOOL: 0
ANAL BLEEDING: 0
DIARRHEA: 0
RECTAL PAIN: 0
CHOKING: 0
VOMITING: 0
CONSTIPATION: 0
ABDOMINAL DISTENTION: 0
ABDOMINAL PAIN: 0
CHEST TIGHTNESS: 0
EYE DISCHARGE: 0
WHEEZING: 0
NAUSEA: 0
EYE ITCHING: 0
SHORTNESS OF BREATH: 0

## 2020-01-28 ASSESSMENT — PATIENT HEALTH QUESTIONNAIRE - PHQ9
2. FEELING DOWN, DEPRESSED OR HOPELESS: 0
SUM OF ALL RESPONSES TO PHQ QUESTIONS 1-9: 0
1. LITTLE INTEREST OR PLEASURE IN DOING THINGS: 0
SUM OF ALL RESPONSES TO PHQ9 QUESTIONS 1 & 2: 0
SUM OF ALL RESPONSES TO PHQ QUESTIONS 1-9: 0

## 2020-01-28 NOTE — PROGRESS NOTES
Shin Jordan is a 61 y.o. male who presents today for   Chief Complaint   Patient presents with    Hyperlipidemia    Other     h/o H. Pylori         HPI      Hyperlipidemia:  No new myalgias or GI upset on eztemibe (Zetia). Medication compliance: compliant all of the time. Patient is  following a low fat, low cholesterol diet. He is  exercising regularly. Lab Results   Component Value Date    CHOL 202 (H) 06/24/2019    TRIG 86 06/24/2019    HDL 45 06/24/2019    LDLCALC 140 (H) 06/24/2019     Lab Results   Component Value Date    ALT 8 06/24/2019    AST 22 06/24/2019          H/o H Pylori  Pt has a significant h/o H Pylori, he does follow with GI-Dr. Chapis Ramires, he has been treated 3 times in the past, pt is currently asymptomatic, he will have a Urea Breath test in the near future for possible eradication of H Pylori       Pt is due for Preventative Labs including PSA and Cologuard        [facility]:  Patient's past medical, surgical, social and/or family history reviewed, updated in chart, and are non-contributory (unless otherwise stated). Medications and allergies also reviewed and updated in chart. Review of Systems  Review of Systems   Constitutional: Negative for activity change, appetite change, chills, diaphoresis, fatigue, fever and unexpected weight change. HENT: Negative for hearing loss, mouth sores, nosebleeds, trouble swallowing and voice change. Eyes: Negative for photophobia, pain, discharge, redness, itching and visual disturbance. Respiratory: Negative for apnea, cough, choking, chest tightness, shortness of breath, wheezing and stridor.         + Smoker   Cardiovascular: Negative for chest pain, palpitations and leg swelling.    Gastrointestinal: Negative for abdominal distention, abdominal pain, anal bleeding, blood in stool, constipation, diarrhea, nausea, rectal pain and vomiting.        H/o H Pylori-all s/s resolved   Endocrine: Negative for cold intolerance, heat Test    Prostate cancer screening  -     PSA SCREENING; Future    Colon cancer screening  -     Cologuard (For External Results Only); Future         Call or go to ED immediately if symptoms worsen or persist.    Return in about 6 months (around 7/28/2020) for f/u Hyperlipidemia. , or sooner if necessary. Educational materials and/or home exercises printed for patient's review and were included in patient instructions on his/her After Visit Summary and given to patient at the end of visit. Counseled regarding above diagnosis, including possible risks and complications,  especially if left uncontrolled. Counseled regarding the possible side effects, risks, benefits and alternatives to treatment; patient and/or guardian verbalizes understanding, agrees, feels comfortable with and wishes to proceed with above treatment plan. Advised patient to call with any new medication issues, and read all Rx info from pharmacy to assure aware of all possible risks and side effects of medication before taking. Reviewed age and gender appropriate health screening exams and vaccinations. Advised patient regarding importance of keeping up with recommended health maintenance and to schedule as soon as possible if overdue, as this is important in assessing for undiagnosed pathology, especially cancer, as well as protecting against potentially harmful/life threatening disease. Patient and/or guardian verbalizes understanding and agrees with above counseling, assessment and plan. All questions answered.     WILLY Henry - CNP

## 2020-08-30 RX ORDER — VARENICLINE TARTRATE 0.5 MG/1
0.5 TABLET, FILM COATED ORAL 2 TIMES DAILY
Qty: 60 TABLET | Refills: 3 | Status: SHIPPED
Start: 2020-08-30 | End: 2021-03-30

## 2021-03-02 ENCOUNTER — TELEPHONE (OUTPATIENT)
Dept: ADMINISTRATIVE | Age: 61
End: 2021-03-02

## 2021-03-02 NOTE — TELEPHONE ENCOUNTER
Pt's wife called to set up an appt for his lab work to be done. There are no active orders in Epic. She states she got a message through [address] Please contact pt's wife to set up an appt for lab work at [phone].

## 2021-03-04 ENCOUNTER — OFFICE VISIT (OUTPATIENT)
Dept: FAMILY MEDICINE CLINIC | Age: 61
End: 2021-03-04
Payer: COMMERCIAL

## 2021-03-04 VITALS
BODY MASS INDEX: 29.25 KG/M2 | DIASTOLIC BLOOD PRESSURE: 82 MMHG | HEIGHT: 66 IN | WEIGHT: 182 LBS | TEMPERATURE: 98.1 F | OXYGEN SATURATION: 96 % | SYSTOLIC BLOOD PRESSURE: 108 MMHG | RESPIRATION RATE: 16 BRPM | HEART RATE: 97 BPM

## 2021-03-04 DIAGNOSIS — Z12.5 PROSTATE CANCER SCREENING: ICD-10-CM

## 2021-03-04 DIAGNOSIS — Z00.00 HEALTH MAINTENANCE EXAMINATION: ICD-10-CM

## 2021-03-04 DIAGNOSIS — E78.2 MIXED HYPERLIPIDEMIA: ICD-10-CM

## 2021-03-04 DIAGNOSIS — R06.02 SHORTNESS OF BREATH: ICD-10-CM

## 2021-03-04 DIAGNOSIS — Z12.11 COLON CANCER SCREENING: ICD-10-CM

## 2021-03-04 DIAGNOSIS — F17.200 SMOKER: ICD-10-CM

## 2021-03-04 LAB
ALBUMIN SERPL-MCNC: 4.7 G/DL (ref 3.5–5.2)
ALP BLD-CCNC: 74 U/L (ref 40–129)
ALT SERPL-CCNC: 15 U/L (ref 0–40)
ANION GAP SERPL CALCULATED.3IONS-SCNC: 11 MMOL/L (ref 7–16)
AST SERPL-CCNC: 27 U/L (ref 0–39)
BASOPHILS ABSOLUTE: 0.06 E9/L (ref 0–0.2)
BASOPHILS RELATIVE PERCENT: 0.6 % (ref 0–2)
BILIRUB SERPL-MCNC: 0.4 MG/DL (ref 0–1.2)
BUN BLDV-MCNC: 17 MG/DL (ref 8–23)
CALCIUM SERPL-MCNC: 9.5 MG/DL (ref 8.6–10.2)
CHLORIDE BLD-SCNC: 102 MMOL/L (ref 98–107)
CHOLESTEROL, TOTAL: 211 MG/DL (ref 0–199)
CO2: 27 MMOL/L (ref 22–29)
CREAT SERPL-MCNC: 0.8 MG/DL (ref 0.7–1.2)
EOSINOPHILS ABSOLUTE: 0.07 E9/L (ref 0.05–0.5)
EOSINOPHILS RELATIVE PERCENT: 0.7 % (ref 0–6)
GFR AFRICAN AMERICAN: >60
GFR NON-AFRICAN AMERICAN: >60 ML/MIN/1.73
GLUCOSE BLD-MCNC: 85 MG/DL (ref 74–99)
HCT VFR BLD CALC: 48.5 % (ref 37–54)
HDLC SERPL-MCNC: 42 MG/DL
HEMOGLOBIN: 15.4 G/DL (ref 12.5–16.5)
IMMATURE GRANULOCYTES #: 0.03 E9/L
IMMATURE GRANULOCYTES %: 0.3 % (ref 0–5)
LDL CHOLESTEROL CALCULATED: 138 MG/DL (ref 0–99)
LYMPHOCYTES ABSOLUTE: 2.37 E9/L (ref 1.5–4)
LYMPHOCYTES RELATIVE PERCENT: 25.2 % (ref 20–42)
MCH RBC QN AUTO: 28.4 PG (ref 26–35)
MCHC RBC AUTO-ENTMCNC: 31.8 % (ref 32–34.5)
MCV RBC AUTO: 89.3 FL (ref 80–99.9)
MONOCYTES ABSOLUTE: 0.7 E9/L (ref 0.1–0.95)
MONOCYTES RELATIVE PERCENT: 7.4 % (ref 2–12)
NEUTROPHILS ABSOLUTE: 6.18 E9/L (ref 1.8–7.3)
NEUTROPHILS RELATIVE PERCENT: 65.8 % (ref 43–80)
PDW BLD-RTO: 13.9 FL (ref 11.5–15)
PLATELET # BLD: 372 E9/L (ref 130–450)
PMV BLD AUTO: 11.3 FL (ref 7–12)
POTASSIUM SERPL-SCNC: 4.7 MMOL/L (ref 3.5–5)
PROSTATE SPECIFIC ANTIGEN: 4.88 NG/ML (ref 0–4)
RBC # BLD: 5.43 E12/L (ref 3.8–5.8)
SODIUM BLD-SCNC: 140 MMOL/L (ref 132–146)
TOTAL PROTEIN: 7.7 G/DL (ref 6.4–8.3)
TRIGL SERPL-MCNC: 155 MG/DL (ref 0–149)
TSH SERPL DL<=0.05 MIU/L-ACNC: 1.64 UIU/ML (ref 0.27–4.2)
VLDLC SERPL CALC-MCNC: 31 MG/DL
WBC # BLD: 9.4 E9/L (ref 4.5–11.5)

## 2021-03-04 PROCEDURE — 99396 PREV VISIT EST AGE 40-64: CPT | Performed by: NURSE PRACTITIONER

## 2021-03-04 PROCEDURE — G8484 FLU IMMUNIZE NO ADMIN: HCPCS | Performed by: NURSE PRACTITIONER

## 2021-03-04 RX ORDER — ALBUTEROL SULFATE 90 UG/1
2 AEROSOL, METERED RESPIRATORY (INHALATION) EVERY 6 HOURS PRN
Qty: 1 INHALER | Refills: 3 | Status: SHIPPED | OUTPATIENT
Start: 2021-03-04

## 2021-03-04 RX ORDER — PANTOPRAZOLE SODIUM 20 MG/1
20 TABLET, DELAYED RELEASE ORAL
Qty: 90 TABLET | Refills: 3 | Status: SHIPPED | OUTPATIENT
Start: 2021-03-04

## 2021-03-04 SDOH — ECONOMIC STABILITY: TRANSPORTATION INSECURITY
IN THE PAST 12 MONTHS, HAS THE LACK OF TRANSPORTATION KEPT YOU FROM MEDICAL APPOINTMENTS OR FROM GETTING MEDICATIONS?: NOT ASKED

## 2021-03-04 SDOH — ECONOMIC STABILITY: FOOD INSECURITY: WITHIN THE PAST 12 MONTHS, THE FOOD YOU BOUGHT JUST DIDN'T LAST AND YOU DIDN'T HAVE MONEY TO GET MORE.: NEVER TRUE

## 2021-03-04 SDOH — ECONOMIC STABILITY: FOOD INSECURITY: WITHIN THE PAST 12 MONTHS, YOU WORRIED THAT YOUR FOOD WOULD RUN OUT BEFORE YOU GOT MONEY TO BUY MORE.: NEVER TRUE

## 2021-03-04 SDOH — ECONOMIC STABILITY: INCOME INSECURITY: HOW HARD IS IT FOR YOU TO PAY FOR THE VERY BASICS LIKE FOOD, HOUSING, MEDICAL CARE, AND HEATING?: NOT HARD AT ALL

## 2021-03-04 ASSESSMENT — PATIENT HEALTH QUESTIONNAIRE - PHQ9
2. FEELING DOWN, DEPRESSED OR HOPELESS: 0
SUM OF ALL RESPONSES TO PHQ QUESTIONS 1-9: 0
1. LITTLE INTEREST OR PLEASURE IN DOING THINGS: 0

## 2021-03-04 ASSESSMENT — ENCOUNTER SYMPTOMS
ANAL BLEEDING: 0
EYE DISCHARGE: 0
NAUSEA: 0
RHINORRHEA: 0
TROUBLE SWALLOWING: 0
EYE ITCHING: 0
COLOR CHANGE: 0
CHOKING: 0
DIARRHEA: 0
EYE PAIN: 0
WHEEZING: 0
ABDOMINAL DISTENTION: 0
APNEA: 0
SINUS PAIN: 0
CHEST TIGHTNESS: 0
STRIDOR: 0
SINUS PRESSURE: 0
SORE THROAT: 0
BLOOD IN STOOL: 0
RECTAL PAIN: 0
CONSTIPATION: 0
COUGH: 0
VOICE CHANGE: 0
SHORTNESS OF BREATH: 1
ABDOMINAL PAIN: 0
VOMITING: 0
EYE REDNESS: 0
PHOTOPHOBIA: 0

## 2021-03-04 NOTE — PROGRESS NOTES
Claudia Banuelos is a 61 y.o. male who presents today for   Chief Complaint   Patient presents with    Health Maintenance    Hyperlipidemia    Shortness of Breath         HPI    Hyperlipidemia:  No new myalgias or GI upset on eztemibe (Zetia). Medication compliance: compliant all of the time. Patient is  following a low fat, low cholesterol diet. He is  exercising regularly. Lab Results   Component Value Date    CHOL 216 (H) 01/28/2020    TRIG 127 01/28/2020    HDL 45 01/28/2020    LDLCALC 146 (H) 01/28/2020     Lab Results   Component Value Date    ALT 12 01/28/2020    AST 24 01/28/2020           Dyspnea  Patient complains of shortness of breath with one block walking. Symptoms include dyspnea on exertion. Symptoms began a few weeks ago, stable since that time. Patient denies constant cough, cough after eating, difficulty breathing, drainage from nose, dry cough, dyspnea when laying down, edema BLE, morning cough, post nasal drip, productive cough, sputum production and wheezing. Associated symptoms include none. Patient has not had recent travel. Weight has been stable. Appetite has been unchanged. Symptoms are exacerbated by moderate activity. Symptoms are alleviated by rest  Pt is a smoker and is currently on Chantix for smoking cessation. Pt is due for Preventative Labs today, will add PSA, pt has followed w/Urology in the past but > 1 yr. He is also agreeable to [address]:  Patient's past medical, surgical, social and/or family history reviewed, updated in chart, and are non-contributory (unless otherwise stated). Medications and allergies also reviewed and updated in chart. Review of Systems  Review of Systems   Constitutional: Negative for activity change, appetite change, chills, diaphoresis, fatigue, fever and unexpected weight change. HENT: Negative for congestion, ear discharge, ear pain, hearing loss, mouth sores, nosebleeds, postnasal drip, rhinorrhea, sinus pressure, sinus pain, sneezing, sore throat, trouble swallowing and voice change. Eyes: Negative for photophobia, pain, discharge, redness, itching and visual disturbance. Respiratory: Positive for shortness of breath (intermittent). Negative for apnea, cough, choking, chest tightness, wheezing and stridor.         + Smoker   Cardiovascular: Negative for chest pain, palpitations and leg swelling. Gastrointestinal: Negative for abdominal distention, abdominal pain, anal bleeding, blood in stool, constipation, diarrhea, nausea, rectal pain and vomiting.        H/o H Pylori-all s/s resolved   Endocrine: Negative for cold intolerance, heat intolerance, polydipsia, polyphagia and polyuria. Genitourinary: Negative for decreased urine volume, difficulty urinating, dysuria, enuresis, flank pain, frequency, hematuria and urgency. Musculoskeletal: Negative for myalgias. Skin: Negative for color change, pallor and rash. Neurological: Negative for dizziness, tremors, seizures, syncope, facial asymmetry, speech difficulty, weakness, light-headedness, numbness and headaches. Hematological: Negative for adenopathy. Does not bruise/bleed easily. Psychiatric/Behavioral: Negative for dysphoric mood, self-injury, sleep disturbance and suicidal ideas. The patient is not nervous/anxious. Physical Exam:    VS:  /82   Pulse 97   Temp 98.1 °F (36.7 °C)   Resp 16   Ht 5' 6\" (1.676 m)   Wt 182 lb (82.6 kg)   SpO2 96%   BMI 29.38 kg/m²   LAST WEIGHT:  Wt Readings from Last 3 Encounters:   03/04/21 182 lb (82.6 kg)   01/28/20 175 lb (79.4 kg)   06/24/19 175 lb (79.4 kg)     Physical Exam  Vitals signs and nursing note reviewed. Constitutional:       General: He is not in acute distress. Appearance: Normal appearance. He is well-developed and normal weight. He is not ill-appearing, toxic-appearing or diaphoretic. HENT:      Head: Normocephalic and atraumatic. Right Ear: Tympanic membrane, ear canal and external ear normal.      Left Ear: Tympanic membrane, ear canal and external ear normal.      Nose: Nose normal. No congestion or rhinorrhea. Mouth/Throat:      Mouth: Mucous membranes are moist.      Pharynx: Oropharynx is clear. No oropharyngeal exudate or posterior oropharyngeal erythema. Eyes:      General:         Right eye: No discharge. Left eye: No discharge. Conjunctiva/sclera: Conjunctivae normal.   Neck:      Musculoskeletal: Normal range of motion and neck supple. No neck rigidity or muscular tenderness. Thyroid: No thyromegaly. Vascular: No JVD. Cardiovascular:      Rate and Rhythm: Normal rate and regular rhythm. Pulses: Normal pulses. Heart sounds: Normal heart sounds. No murmur. Comments: No peripheral edema  Pulmonary:      Effort: Pulmonary effort is normal. No respiratory distress. Breath sounds: Normal breath sounds. No stridor. No wheezing, rhonchi or rales. Comments: Pulse ox 96%, no coughing  Chest:      Chest wall: No tenderness. Abdominal:      Tenderness: There is no abdominal tenderness. Musculoskeletal: Normal range of motion. General: No tenderness or deformity. Lymphadenopathy:      Cervical: No cervical adenopathy. Skin:     General: Skin is warm and dry. Coloration: Skin is not jaundiced or pale. Findings: No bruising, erythema, lesion or rash. Neurological:      Mental Status: He is alert and oriented to person, place, and time. Mental status is at baseline. Motor: No weakness or abnormal muscle tone. Gait: Gait normal.   Psychiatric:         Mood and Affect: Mood normal.         Behavior: Behavior normal.         Thought Content:  Thought content normal. Judgment: Judgment normal.             Assessment / Plan:      Rod Bridges was seen today for health maintenance and hyperlipidemia. Diagnoses and all orders for this visit:    Health maintenance examination  -     PSA SCREENING; Future  -     CBC Auto Differential; Future  -     Comprehensive Metabolic Panel; Future  -     Lipid Panel; Future  -     TSH without Reflex; Future    Mixed hyperlipidemia  Continue Zetia and Lifestyle Modifications  -     CBC Auto Differential; Future  -     Comprehensive Metabolic Panel; Future  -     Lipid Panel; Future  -     TSH without Reflex; Future    Smoker  Continue Chantix  Chest xray as ordered  Discussed possible COPD and need to stop smoking  -     XR CHEST (2 VW); Future  -     albuterol sulfate HFA (VENTOLIN HFA) 108 (90 Base) MCG/ACT inhaler; Inhale 2 puffs into the lungs every 6 hours as needed for Wheezing or Shortness of Breath    Shortness of breath  As above  -     XR CHEST (2 VW); Future  -     albuterol sulfate HFA (VENTOLIN HFA) 108 (90 Base) MCG/ACT inhaler; Inhale 2 puffs into the lungs every 6 hours as needed for Wheezing or Shortness of Breath    Colon cancer screening  -     Cologuard (For External Results Only); Future    Prostate cancer screening  -     Cancel: PSA SCREENING; Future       Call or go to ED immediately if symptoms worsen or persist.    Return for f/u will be based on diagnostic testing., or sooner if necessary. Educational materials and/or home exercises printed for patient's review and were included in patient instructions on his/her After Visit Summary and given to patient at the end of visit. Counseled regarding above diagnosis, including possible risks and complications,  especially if left uncontrolled. Counseled regarding the possible side effects, risks, benefits and alternatives to treatment; patient and/or guardian verbalizes understanding, agrees, feels comfortable with and wishes to proceed with above treatment plan. Advised patient to call with any new medication issues, and read all Rx info from pharmacy to assure aware of all possible risks and side effects of medication before taking. Reviewed age and gender appropriate health screening exams and vaccinations. Advised patient regarding importance of keeping up with recommended health maintenance and to schedule as soon as possible if overdue, as this is important in assessing for undiagnosed pathology, especially cancer, as well as protecting against potentially harmful/life threatening disease. Patient and/or guardian verbalizes understanding and agrees with above counseling, assessment and plan. All questions answered.     Bernard Hemphill, APRN - CNP

## 2021-03-08 ENCOUNTER — HOSPITAL ENCOUNTER (OUTPATIENT)
Dept: GENERAL RADIOLOGY | Age: 61
Discharge: HOME | End: 2021-03-10
Payer: COMMERCIAL

## 2021-03-08 ENCOUNTER — HOSPITAL ENCOUNTER (OUTPATIENT)
Age: 61
Discharge: HOME | End: 2021-03-10
Payer: COMMERCIAL

## 2021-03-08 DIAGNOSIS — F17.200 SMOKER: ICD-10-CM

## 2021-03-08 DIAGNOSIS — R06.02 SHORTNESS OF BREATH: ICD-10-CM

## 2021-03-08 PROCEDURE — 71046 X-RAY EXAM CHEST 2 VIEWS: CPT

## 2021-04-12 ENCOUNTER — OFFICE VISIT (OUTPATIENT)
Dept: FAMILY MEDICINE CLINIC | Age: 61
End: 2021-04-12
Payer: COMMERCIAL

## 2021-04-12 VITALS
WEIGHT: 186.3 LBS | BODY MASS INDEX: 29.94 KG/M2 | OXYGEN SATURATION: 99 % | HEIGHT: 66 IN | SYSTOLIC BLOOD PRESSURE: 120 MMHG | HEART RATE: 81 BPM | RESPIRATION RATE: 18 BRPM | DIASTOLIC BLOOD PRESSURE: 84 MMHG | TEMPERATURE: 98.9 F

## 2021-04-12 DIAGNOSIS — J44.9 CHRONIC OBSTRUCTIVE PULMONARY DISEASE, UNSPECIFIED COPD TYPE (HCC): Primary | ICD-10-CM

## 2021-04-12 DIAGNOSIS — Z87.891 QUIT SMOKING: ICD-10-CM

## 2021-04-12 PROCEDURE — 3023F SPIROM DOC REV: CPT | Performed by: NURSE PRACTITIONER

## 2021-04-12 PROCEDURE — 1036F TOBACCO NON-USER: CPT | Performed by: NURSE PRACTITIONER

## 2021-04-12 PROCEDURE — G8926 SPIRO NO PERF OR DOC: HCPCS | Performed by: NURSE PRACTITIONER

## 2021-04-12 PROCEDURE — 3017F COLORECTAL CA SCREEN DOC REV: CPT | Performed by: NURSE PRACTITIONER

## 2021-04-12 PROCEDURE — G8427 DOCREV CUR MEDS BY ELIG CLIN: HCPCS | Performed by: NURSE PRACTITIONER

## 2021-04-12 PROCEDURE — 99214 OFFICE O/P EST MOD 30 MIN: CPT | Performed by: NURSE PRACTITIONER

## 2021-04-12 PROCEDURE — G8417 CALC BMI ABV UP PARAM F/U: HCPCS | Performed by: NURSE PRACTITIONER

## 2021-04-12 ASSESSMENT — ENCOUNTER SYMPTOMS
CHEST TIGHTNESS: 0
EYE ITCHING: 0
WHEEZING: 0
SINUS PRESSURE: 0
VOMITING: 0
ABDOMINAL PAIN: 0
VOICE CHANGE: 0
COUGH: 0
COLOR CHANGE: 0
SORE THROAT: 0
EYE REDNESS: 0
NAUSEA: 0
SHORTNESS OF BREATH: 1
RHINORRHEA: 0
EYE DISCHARGE: 0
DIARRHEA: 0
SINUS PAIN: 0
PHOTOPHOBIA: 0
TROUBLE SWALLOWING: 0
APNEA: 0
EYE PAIN: 0
STRIDOR: 0
CHOKING: 0

## 2021-04-12 NOTE — PROGRESS NOTES
Merry Will is a 64 y.o. male who presents today for   Chief Complaint   Patient presents with    COPD         HPI      COPD:  Current treatment includes Spiriva and Albuterol PRN. Residual symptoms: mild dyspnea w/activity. He denies increased dyspnea, purulent sputum, wheezing, chest tightness, chest pain. He requires his rescue inhaler few time(s) per month. Pt quit smoking 3/12/21. Pt recently had chest xray on 3/8/21        [facility]:  Patient's past medical, surgical, social and/or family history reviewed, updated in chart, and are non-contributory (unless otherwise stated). Medications and allergies also reviewed and updated in chart. Review of Systems  Review of Systems   Constitutional: Negative for activity change, appetite change, chills, diaphoresis, fatigue and fever. HENT: Negative for congestion, ear discharge, ear pain, postnasal drip, rhinorrhea, sinus pressure, sinus pain, sneezing, sore throat, trouble swallowing and voice change. Eyes: Negative for photophobia, pain, discharge, redness, itching and visual disturbance. Respiratory: Positive for shortness of breath (mild-w/activity). Negative for apnea, cough, choking, chest tightness, wheezing and stridor. COPD/Quit Smoking   Cardiovascular: Negative for chest pain, palpitations and leg swelling. Gastrointestinal: Negative for abdominal pain, diarrhea, nausea and vomiting. Skin: Negative for color change, pallor and rash. Physical Exam:    VS:  /84 (Site: Right Upper Arm, Position: Sitting, Cuff Size: Medium Adult)   Pulse 81   Temp 98.9 °F (37.2 °C)   Resp 18   Ht 5' 6\" (1.676 m)   Wt 186 lb 4.8 oz (84.5 kg)   SpO2 99%   BMI 30.07 kg/m²   LAST WEIGHT:  Wt Readings from Last 3 Encounters:   04/12/21 186 lb 4.8 oz (84.5 kg)   03/04/21 182 lb (82.6 kg)   01/28/20 175 lb (79.4 kg)     Physical Exam  Vitals signs and nursing note reviewed.    Constitutional:       General: He is not in acute distress. Appearance: Normal appearance. He is normal weight. He is not ill-appearing, toxic-appearing or diaphoretic. Eyes:      General:         Right eye: No discharge. Left eye: No discharge. Conjunctiva/sclera: Conjunctivae normal.   Neck:      Musculoskeletal: Normal range of motion and neck supple. No neck rigidity. Cardiovascular:      Rate and Rhythm: Normal rate and regular rhythm. Pulses: Normal pulses. Heart sounds: Normal heart sounds. Pulmonary:      Effort: Pulmonary effort is normal. No respiratory distress. Breath sounds: Normal breath sounds. No stridor. No wheezing, rhonchi or rales. Comments: Pulse ox 99%- no coughing present  Chest:      Chest wall: No tenderness. Lymphadenopathy:      Cervical: No cervical adenopathy. Skin:     General: Skin is warm. Coloration: Skin is not jaundiced or pale. Findings: No bruising, erythema, lesion or rash. Neurological:      Mental Status: He is alert. Assessment / Plan:      Munir Marion was seen today for copd. Diagnoses and all orders for this visit:    Chronic obstructive pulmonary disease, unspecified COPD type ([address]  Continue Spiriva and Albuterol as discussed  Discussed benefits of daily preventative inhaler    Quit Smoking         Call or go to ED immediately if symptoms worsen or persist.    Return in about 6 months (around 10/12/2021) for f/u COPD., or sooner if necessary. Educational materials and/or home exercises printed for patient's review and were included in patient instructions on his/her After Visit Summary and given to patient at the end of visit. Counseled regarding above diagnosis, including possible risks and complications,  especially if left uncontrolled.     Counseled regarding the possible side effects, risks, benefits and alternatives to treatment; patient and/or guardian verbalizes understanding, agrees, feels comfortable with and wishes to proceed with above treatment plan. Advised patient to call with any new medication issues, and read all Rx info from pharmacy to assure aware of all possible risks and side effects of medication before taking. Reviewed age and gender appropriate health screening exams and vaccinations. Advised patient regarding importance of keeping up with recommended health maintenance and to schedule as soon as possible if overdue, as this is important in assessing for undiagnosed pathology, especially cancer, as well as protecting against potentially harmful/life threatening disease. Patient and/or guardian verbalizes understanding and agrees with above counseling, assessment and plan. All questions answered.     Larissa Grayson, APRN - CNP

## 2021-05-24 DIAGNOSIS — J44.9 CHRONIC OBSTRUCTIVE PULMONARY DISEASE, UNSPECIFIED COPD TYPE (HCC): Primary | ICD-10-CM

## 2021-10-08 ENCOUNTER — TELEPHONE (OUTPATIENT)
Dept: FAMILY MEDICINE CLINIC | Age: 61
End: 2021-10-08

## 2021-10-08 NOTE — TELEPHONE ENCOUNTER
----- Message from Courtneykenny Sams sent at 10/8/2021 10:54 AM EDT -----  Subject: Message to Provider    QUESTIONS  Information for Provider? Patient moved to [place] permanently.  ---------------------------------------------------------------------------  --------------  [address]  What is the best way for the office to contact you? OK to leave message on   voicemail  Preferred Call Back Phone Number? [phone]  ---------------------------------------------------------------------------  --------------  SCRIPT ANSWERS  Relationship to Patient? Other  Representative Name? Pascale Shepard  Is the Representative on the appropriate HIPAA document in Epic?  Yes